# Patient Record
Sex: FEMALE | Race: WHITE | NOT HISPANIC OR LATINO | Employment: FULL TIME | URBAN - METROPOLITAN AREA
[De-identification: names, ages, dates, MRNs, and addresses within clinical notes are randomized per-mention and may not be internally consistent; named-entity substitution may affect disease eponyms.]

---

## 2023-01-30 PROBLEM — J45.909 ASTHMATIC BRONCHITIS (HHS-HCC): Status: ACTIVE | Noted: 2023-01-30

## 2023-01-30 PROBLEM — E55.9 VITAMIN D DEFICIENCY: Status: ACTIVE | Noted: 2023-01-30

## 2023-01-30 PROBLEM — R73.09 ABNORMAL GLUCOSE: Status: ACTIVE | Noted: 2023-01-30

## 2023-01-30 PROBLEM — E03.9 HYPOTHYROIDISM: Status: ACTIVE | Noted: 2023-01-30

## 2023-01-30 PROBLEM — F41.9 ANXIETY: Status: ACTIVE | Noted: 2023-01-30

## 2023-01-30 RX ORDER — DROSPIRENONE AND ETHINYL ESTRADIOL 0.02-3(28)
1 KIT ORAL DAILY
COMMUNITY
Start: 2022-06-01

## 2023-01-30 RX ORDER — AZELASTINE 1 MG/ML
SPRAY, METERED NASAL
COMMUNITY
Start: 2022-05-24 | End: 2023-04-18 | Stop reason: ALTCHOICE

## 2023-01-30 RX ORDER — CHOLECALCIFEROL (VITAMIN D3) 125 MCG
1 CAPSULE ORAL DAILY
COMMUNITY
Start: 2022-01-21

## 2023-01-30 RX ORDER — FLUTICASONE PROPIONATE AND SALMETEROL 100; 50 UG/1; UG/1
1 POWDER RESPIRATORY (INHALATION) EVERY 12 HOURS
COMMUNITY
Start: 2022-06-01

## 2023-01-30 RX ORDER — ALBUTEROL SULFATE 90 UG/1
2 AEROSOL, METERED RESPIRATORY (INHALATION) EVERY 4 HOURS PRN
COMMUNITY
Start: 2022-01-21

## 2023-01-30 RX ORDER — BUSPIRONE HYDROCHLORIDE 15 MG/1
TABLET ORAL EVERY 12 HOURS
COMMUNITY
Start: 2022-06-01 | End: 2023-10-06 | Stop reason: ALTCHOICE

## 2023-01-30 RX ORDER — LEVOTHYROXINE SODIUM 25 UG/1
TABLET ORAL
COMMUNITY
Start: 2022-01-15 | End: 2023-05-30 | Stop reason: SDUPTHER

## 2023-04-05 ENCOUNTER — PATIENT MESSAGE (OUTPATIENT)
Dept: PRIMARY CARE | Facility: CLINIC | Age: 33
End: 2023-04-05
Payer: COMMERCIAL

## 2023-04-05 DIAGNOSIS — G93.32 POST-COVID CHRONIC FATIGUE: ICD-10-CM

## 2023-04-05 DIAGNOSIS — U09.9 POST-COVID CHRONIC FATIGUE: ICD-10-CM

## 2023-04-05 NOTE — TELEPHONE ENCOUNTER
From: Mara Herbert  To: Lyn Morrissey, DO  Sent: 4/5/2023 4:21 PM EDT  Subject: Fatigue    Hi Dr Morrissey,   I hope you are doing well. I know a few months ago I requested my labs to be done early due to extreme fatigue lately. Looks like all my labs were WNL. I’m still experiencing terrible midday fatigue where I have to nap (kind of reminds me of pregnancy). I was trying to ride it out until my annual appointment, but I had to move that back for work travel. I have had Covid 3x - fina if it is lingering effects. Any thoughts in the mean time? Should I make an appointment in the interim?    Mona

## 2023-04-18 ENCOUNTER — OFFICE VISIT (OUTPATIENT)
Dept: PRIMARY CARE | Facility: CLINIC | Age: 33
End: 2023-04-18
Payer: COMMERCIAL

## 2023-04-18 VITALS
BODY MASS INDEX: 28.34 KG/M2 | WEIGHT: 150 LBS | OXYGEN SATURATION: 98 % | HEART RATE: 82 BPM | TEMPERATURE: 97.1 F | DIASTOLIC BLOOD PRESSURE: 81 MMHG | SYSTOLIC BLOOD PRESSURE: 133 MMHG

## 2023-04-18 DIAGNOSIS — G47.19 EXCESSIVE DAYTIME SLEEPINESS: Primary | ICD-10-CM

## 2023-04-18 PROCEDURE — 1036F TOBACCO NON-USER: CPT | Performed by: NURSE PRACTITIONER

## 2023-04-18 PROCEDURE — 99213 OFFICE O/P EST LOW 20 MIN: CPT | Performed by: NURSE PRACTITIONER

## 2023-04-18 ASSESSMENT — ENCOUNTER SYMPTOMS
ABDOMINAL DISTENTION: 0
PALPITATIONS: 0
SPEECH DIFFICULTY: 0
FACIAL SWELLING: 0
CHILLS: 0
FREQUENCY: 0
SHORTNESS OF BREATH: 0
SINUS PAIN: 0
NUMBNESS: 0
BLOOD IN STOOL: 0
ACTIVITY CHANGE: 1
CONSTIPATION: 0
VOMITING: 0
FLANK PAIN: 0
RHINORRHEA: 0
WHEEZING: 0
HEADACHES: 0
NAUSEA: 0
UNEXPECTED WEIGHT CHANGE: 0
WEAKNESS: 0
FATIGUE: 1
FEVER: 0
DIARRHEA: 0
APPETITE CHANGE: 0

## 2023-04-18 NOTE — PATIENT INSTRUCTIONS
Your sleep study will be mailed to you within the next 1-2 weeks   Please let us know if you do not hear anything from them   Please follow-up with COVID long-haul clinic as planned for August 2023  Please follow-up with your PCP as needed

## 2023-04-18 NOTE — ASSESSMENT & PLAN NOTE
Refer to COVID long-parag [facility]--plan for August 2023  Sleep study ordered  Blood work reviewed with patient

## 2023-04-18 NOTE — PROGRESS NOTES
Subjective   Chief Complaint: Fatigue (Ongoing since December. ).    DONTRELL Herbert is a 33 y.o. female who presents for Fatigue (Ongoing since December. ).    Patient reports that she has been feeling fatigued since December 2022.  She reports having had COVID 3 times prior to the start of her fatigue.  Patient reports that she reached out to the [facility] and was told that her earliest appointment will be August 2023.    Recent blood work was reviewed with patient.  Blood work does not show any indications of anemia, thyroid abnormalities.  She continues to use Synthroid daily 25 mcg as prescribed.    Patient reports that her fatigue is severe.  She reports that every day between 1:30 PM to 2 PM she has to take a nap.    She reports that she has 2 young children age of 1-1/2 and 4.  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  Patient reports that she goes to bed at 9:30 PM every night and is awake and at least 1-2 times a night by her child (tween 11 PM and 1 AM) and wakes up at 5 AM every morning.  Patient reports that she was exercising up until 1 month ago when she injured her foot.  She reports a healthy diet.  Patient denies increased stressors in her life.  She reports that her job is not stressful.  Patient denies anxiety and reports that this is well controlled on the BuSpar.  She reports taking the BuSpar every morning and every evening.    Patient reports normal bowel movements.  She denies snoring at night.  Patient reports that she wakes up every morning not feeling well rested.  She reports being sexually active.  Her last menstrual period was 3 weeks ago.  She has taken multiple pregnancy tests over the course of the last few months which have all been negative.    Patient reports 1 caffeinated beverage daily.  She reports social alcohol use.  Denies recreational drug use      Review of Systems   Constitutional:  Positive for activity change and fatigue. Negative  for appetite change, chills, fever and unexpected weight change.   HENT:  Negative for congestion, ear pain, facial swelling, postnasal drip, rhinorrhea and sinus pain.    Respiratory:  Negative for shortness of breath and wheezing.    Cardiovascular:  Negative for chest pain, palpitations and leg swelling.   Gastrointestinal:  Negative for abdominal distention, blood in stool, constipation, diarrhea, nausea and vomiting.   Genitourinary:  Negative for flank pain, frequency and urgency.   Neurological:  Negative for speech difficulty, weakness, numbness and headaches.       Objective   /81   Pulse 82   Temp 36.2 °C (97.1 °F) (Temporal)   Wt 68 kg (150 lb)   SpO2 98%   BMI 28.34 kg/m²   BSA Body surface area is 1.71 meters squared.      Physical Exam  Constitutional:       Appearance: Normal appearance.   HENT:      Right Ear: Tympanic membrane normal.      Left Ear: Tympanic membrane normal.      Nose: Nose normal.   Eyes:      Extraocular Movements: Extraocular movements intact.      Pupils: Pupils are equal, round, and reactive to light.   Cardiovascular:      Rate and Rhythm: Normal rate and regular rhythm.      Pulses: Normal pulses.      Heart sounds: Normal heart sounds.   Pulmonary:      Effort: Pulmonary effort is normal.      Breath sounds: Normal breath sounds.   Abdominal:      General: Abdomen is flat.      Palpations: Abdomen is soft.   Musculoskeletal:         General: Normal range of motion.   Skin:     General: Skin is warm.   Neurological:      General: No focal deficit present.      Mental Status: She is alert.       Legacy Encounter on 01/17/2023   Component Date Value Ref Range Status    Glucose 01/17/2023 87  74 - 99 mg/dL Final    Sodium 01/17/2023 140  136 - 145 mmol/L Final    Potassium 01/17/2023 4.3  3.5 - 5.3 mmol/L Final    Chloride 01/17/2023 107  98 - 107 mmol/L Final    Bicarbonate 01/17/2023 27  21 - 32 mmol/L Final    Anion Gap 01/17/2023 10  10 - 20 mmol/L Final    Urea  Nitrogen 01/17/2023 13  6 - 23 mg/dL Final    Creatinine 01/17/2023 0.85  0.50 - 1.05 mg/dL Final    GFR Female 01/17/2023 >90  >90 mL/min/1.73m2 Final    Comment:  CALCULATIONS OF ESTIMATED GFR ARE PERFORMED   USING THE 2021 CKD-EPI STUDY REFIT EQUATION   WITHOUT THE RACE VARIABLE FOR THE IDMS-TRACEABLE   CREATININE METHODS.    https://jasn.asnjournals.org/content/early/2021/09/22/ASN.8920178123      Calcium 01/17/2023 9.1  8.6 - 10.6 mg/dL Final    Albumin 01/17/2023 3.7  3.4 - 5.0 g/dL Final    Alkaline Phosphatase 01/17/2023 50  33 - 110 U/L Final    Total Protein 01/17/2023 6.4  6.4 - 8.2 g/dL Final    AST 01/17/2023 17  9 - 39 U/L Final    Total Bilirubin 01/17/2023 0.8  0.0 - 1.2 mg/dL Final    ALT (SGPT) 01/17/2023 16  7 - 45 U/L Final    Comment:  Patients treated with Sulfasalazine may generate    falsely decreased results for ALT.      WBC 01/17/2023 6.4  4.4 - 11.3 x10E9/L Final    nRBC 01/17/2023 0.0  0.0 - 0.0 /100 WBC Final    RBC 01/17/2023 4.74  4.00 - 5.20 x10E12/L Final    Hemoglobin 01/17/2023 13.6  12.0 - 16.0 g/dL Final    Hematocrit 01/17/2023 40.9  36.0 - 46.0 % Final    MCV 01/17/2023 86  80 - 100 fL Final    MCHC 01/17/2023 33.3  32.0 - 36.0 g/dL Final    Platelets 01/17/2023 258  150 - 450 x10E9/L Final    RDW 01/17/2023 12.5  11.5 - 14.5 % Final    Neutrophils % 01/17/2023 56.7  40.0 - 80.0 % Final    Immature Granulocytes %, Automated 01/17/2023 0.3  0.0 - 0.9 % Final    Comment:  Immature Granulocyte Count (IG) includes promyelocytes,    myelocytes and metamyelocytes but does not include bands.   Percent differential counts (%) should be interpreted in the   context of the absolute cell counts (cells/L).      Lymphocytes % 01/17/2023 31.3  13.0 - 44.0 % Final    Monocytes % 01/17/2023 7.0  2.0 - 10.0 % Final    Eosinophils % 01/17/2023 4.2  0.0 - 6.0 % Final    Basophils % 01/17/2023 0.5  0.0 - 2.0 % Final    Neutrophils Absolute 01/17/2023 3.63  1.20 - 7.70 x10E9/L Final    Lymphocytes  Absolute 01/17/2023 2.00  1.20 - 4.80 x10E9/L Final    Monocytes Absolute 01/17/2023 0.45  0.10 - 1.00 x10E9/L Final    Eosinophils Absolute 01/17/2023 0.27  0.00 - 0.70 x10E9/L Final    Basophils Absolute 01/17/2023 0.03  0.00 - 0.10 x10E9/L Final    TSH 01/17/2023 1.37  0.44 - 3.98 mIU/L Final    Comment:  TSH testing is performed using different testing    methodology at [facility] than at other    [facility]. Direct result comparisons should    only be made within the same method.      Cholesterol 01/17/2023 166  0 - 199 mg/dL Final    Comment: .      AGE      DESIRABLE   BORDERLINE HIGH   HIGH     0-19 Y     0 - 169       170 - 199     >/= 200    20-24 Y     0 - 189       190 - 224     >/= 225         >24 Y     0 - 199       200 - 239     >/= 240   **All ranges are based on fasting samples. Specific   therapeutic targets will vary based on patient-specific   cardiac risk.  .   Pediatric guidelines reference:Pediatrics 2011, 128(S5).   Adult guidelines reference: NCEP ATPIII Guidelines,     ZENAIDA 2001, 258:2486-97  .   Venipuncture immediately after or during the    administration of Metamizole may lead to falsely   low results. Testing should be performed immediately   prior to Metamizole dosing.      HDL 01/17/2023 68.0  mg/dL Final    Comment: .      AGE      VERY LOW   LOW     NORMAL    HIGH       0-19 Y       < 35   < 40     40-45     ----    20-24 Y       ----   < 40       >45     ----      >24 Y       ----   < 40     40-60      >60  .      Cholesterol/HDL Ratio 01/17/2023 2.4   Final    Comment: REF VALUES  DESIRABLE  < 3.4  HIGH RISK  > 5.0      LDL 01/17/2023 81  0 - 99 mg/dL Final    Comment: .                           NEAR      BORD      AGE      DESIRABLE  OPTIMAL    HIGH     HIGH     VERY HIGH     0-19 Y     0 - 109     ---    110-129   >/= 130     ----    20-24 Y     0 - 119     ---    120-159   >/= 160     ----      >24 Y     0 -  99   100-129  130-159   160-189     >/=190  .       VLDL 01/17/2023 17  0 - 40 mg/dL Final    Triglycerides 01/17/2023 83  0 - 149 mg/dL Final    Comment: .      AGE      DESIRABLE   BORDERLINE HIGH   HIGH     VERY HIGH   0 D-90 D    19 - 174         ----         ----        ----  91 D- 9 Y     0 -  74        75 -  99     >/= 100      ----    10-19 Y     0 -  89        90 - 129     >/= 130      ----    20-24 Y     0 - 114       115 - 149     >/= 150      ----         >24 Y     0 - 149       150 - 199    200- 499    >/= 500  .   Venipuncture immediately after or during the    administration of Metamizole may lead to falsely   low results. Testing should be performed immediately   prior to Metamizole dosing.      Vitamin D, 25-Hydroxy 01/17/2023 31  ng/mL Final    Comment: .  DEFICIENCY:         < 20   NG/ML  INSUFFICIENCY:      20-29  NG/ML  SUFFICIENCY:         NG/ML    THIS ASSAY ACCURATELY QUANTIFIES THE SUM OF  VITAMIN D3, 25-HYDROXY AND VIT D2,25-HYDROXY.     Legacy Encounter on 06/01/2022   Component Date Value Ref Range Status    TSH 06/01/2022 1.69  0.44 - 3.98 mIU/L Final    Comment:  TSH testing is performed using different testing    methodology at [facility] than at other    [facility]. Direct result comparisons should    only be made within the same method.     Legacy Encounter on 06/01/2022   Component Date Value Ref Range Status    SARS-COV-2 RESULT 06/01/2022 NOT DETECTED  Not Detected Final    Comment: .  This assay is designed to detect the N, ORF1ab and/or S genes of SARS-CoV-2   via nucleic acid amplification.  A Negative (NOT DETECTED) result does not   preclude 2019-nCoV infection since the adequacy of sample collection and/or   low viral burden may result in presence of viral nucleic acids below the   clinical sensitivity of this test method.  Negative (NOT DETECTED) result   should not be used as the sole basis for treatment or other patient   management decisions. Rather negative results should be combined with    clinical observations, patient history, and epidemiological information to   make patient management decisions.    Fact sheet for providers: https://www.fda.gov/media/310527/download  Fact sheet for patients: https://www.fda.gov/media/297671/download    This test has received FDA Emergency Use Authorization (EUA) and has been   verified by [facility] ([facility]). This test   is only authorized for the duration of time that circumstances                            exist to   justify the authorization of the emergency use of in vitro diagnostic tests   for the detection of SARS-CoV-2 virus and/or diagnosis of COVID-19 infection   under section 564(b)(1) of the Act, 21 U.S.C. 360bbb-3(b)(1), unless the   authorization is terminated or revoked sooner.      [facility] is certified under CLIA-88 as   qualified to perform high complexity testing. Testing is performed in the   [facility] laboratories located at [address] [place] [address].       Current Outpatient Medications on File Prior to Visit   Medication Sig Dispense Refill    albuterol 90 mcg/actuation inhaler Inhale 2 puffs every 4 hours if needed for shortness of breath.      busPIRone (Buspar) 15 mg tablet Take by mouth every 12 (twelve) hours.      cholecalciferol (Vitamin D-3) 125 MCG (5000 UT) capsule Take 1 capsule (125 mcg) by mouth once daily.      drospirenone-ethinyl estradioL (Edilia, Gianvi) 3-0.02 mg tablet Take 1 tablet by mouth once daily.      fluticasone propion-salmeteroL (Advair Diskus) 100-50 mcg/dose diskus inhaler Inhale 1 puff  in the morning and 1 puff in the evening.      levothyroxine (Synthroid, Levoxyl) 25 mcg tablet Take by mouth.      [DISCONTINUED] azelastine (Astelin) 137 mcg (0.1 %) nasal spray Administer into affected nostril(s).       No current facility-administered medications on file prior to visit.     No images are attached to the encounter.             Assessment/Plan   Problem List Items Addressed This Visit          Nervous    Excessive daytime sleepiness - Primary     Refer to COVID long-parag [facility]--plan for August 2023  Sleep study ordered  Blood work reviewed with patient           Relevant Orders    Home sleep apnea test (HSAT)

## 2023-04-28 ENCOUNTER — OFFICE VISIT (OUTPATIENT)
Dept: PRIMARY CARE | Facility: CLINIC | Age: 33
End: 2023-04-28
Payer: COMMERCIAL

## 2023-04-28 VITALS
DIASTOLIC BLOOD PRESSURE: 70 MMHG | SYSTOLIC BLOOD PRESSURE: 118 MMHG | WEIGHT: 149 LBS | BODY MASS INDEX: 28.13 KG/M2 | HEART RATE: 80 BPM | HEIGHT: 61 IN

## 2023-04-28 DIAGNOSIS — E55.9 VITAMIN D DEFICIENCY: ICD-10-CM

## 2023-04-28 DIAGNOSIS — Z00.00 HEALTHCARE MAINTENANCE: Primary | ICD-10-CM

## 2023-04-28 DIAGNOSIS — R73.09 ABNORMAL GLUCOSE: ICD-10-CM

## 2023-04-28 DIAGNOSIS — E03.9 HYPOTHYROIDISM, UNSPECIFIED TYPE: ICD-10-CM

## 2023-04-28 PROBLEM — J45.909 ASTHMATIC BRONCHITIS (HHS-HCC): Status: RESOLVED | Noted: 2023-01-30 | Resolved: 2023-04-28

## 2023-04-28 PROCEDURE — 1036F TOBACCO NON-USER: CPT | Performed by: FAMILY MEDICINE

## 2023-04-28 PROCEDURE — 99395 PREV VISIT EST AGE 18-39: CPT | Performed by: FAMILY MEDICINE

## 2023-04-28 ASSESSMENT — PATIENT HEALTH QUESTIONNAIRE - PHQ9
SUM OF ALL RESPONSES TO PHQ9 QUESTIONS 1 AND 2: 0
1. LITTLE INTEREST OR PLEASURE IN DOING THINGS: NOT AT ALL
2. FEELING DOWN, DEPRESSED OR HOPELESS: NOT AT ALL

## 2023-04-28 NOTE — PROGRESS NOTES
"Subjective   Patient ID: Mara Herbert is a 33 y.o. female who presents for Annual Exam.    HPI   Patient presents for physical exam. Patient has been diagnosed with hypothyroidism and vitamin d deficiency. She had previously had elevation in one hour glucose tolerance test but 3 hour normal.     Fam Hx  Mom () living,  Dad () living,  PGM colon polyps not cancer     Exercise walks, yoga, runs  ETOH denies, once a month  Caffeine 16 oz coffee/day  Tobacco denies     OB/GYN Dr. Talbert     Mammogram @ 41  DEXA @ 66  EGD, Colonoscopy  Dr. Sewell not due until 45-50      Past Med Hx  Hypothyroidism  Vitamin D deficiency  Eosinophilic esophagitis (resolved)  severe asthma (resolved)     Past Surg Hx  T & A  TMJ surgery  2      Patient denies other complaints.   Review of Systems    Objective   /70 (BP Location: Right arm)   Pulse 80   Ht 1.549 m (5' 1\")   Wt 67.6 kg (149 lb)   BMI 28.15 kg/m²   BSA Body surface area is 1.71 meters squared.      Physical Exam  Legacy Encounter on 2023   Component Date Value Ref Range Status   • Glucose 2023 87  74 - 99 mg/dL Final   • Sodium 2023 140  136 - 145 mmol/L Final   • Potassium 2023 4.3  3.5 - 5.3 mmol/L Final   • Chloride 2023 107  98 - 107 mmol/L Final   • Bicarbonate 2023 27  21 - 32 mmol/L Final   • Anion Gap 2023 10  10 - 20 mmol/L Final   • Urea Nitrogen 2023 13  6 - 23 mg/dL Final   • Creatinine 2023 0.85  0.50 - 1.05 mg/dL Final   • GFR Female 2023 >90  >90 mL/min/1.73m2 Final    Comment:  CALCULATIONS OF ESTIMATED GFR ARE PERFORMED   USING THE  CKD-EPI STUDY REFIT EQUATION   WITHOUT THE RACE VARIABLE FOR THE IDMS-TRACEABLE   CREATININE METHODS.    https://jasn.asnjournals.org/content/early//ASN.5761151315     • Calcium 2023 9.1  8.6 - 10.6 mg/dL Final   • Albumin 2023 3.7  3.4 - 5.0 g/dL Final   • Alkaline Phosphatase 2023 50  33 - 110 U/L Final   • Total " Protein 01/17/2023 6.4  6.4 - 8.2 g/dL Final   • AST 01/17/2023 17  9 - 39 U/L Final   • Total Bilirubin 01/17/2023 0.8  0.0 - 1.2 mg/dL Final   • ALT (SGPT) 01/17/2023 16  7 - 45 U/L Final    Comment:  Patients treated with Sulfasalazine may generate    falsely decreased results for ALT.     • WBC 01/17/2023 6.4  4.4 - 11.3 x10E9/L Final   • nRBC 01/17/2023 0.0  0.0 - 0.0 /100 WBC Final   • RBC 01/17/2023 4.74  4.00 - 5.20 x10E12/L Final   • Hemoglobin 01/17/2023 13.6  12.0 - 16.0 g/dL Final   • Hematocrit 01/17/2023 40.9  36.0 - 46.0 % Final   • MCV 01/17/2023 86  80 - 100 fL Final   • MCHC 01/17/2023 33.3  32.0 - 36.0 g/dL Final   • Platelets 01/17/2023 258  150 - 450 x10E9/L Final   • RDW 01/17/2023 12.5  11.5 - 14.5 % Final   • Neutrophils % 01/17/2023 56.7  40.0 - 80.0 % Final   • Immature Granulocytes %, Automated 01/17/2023 0.3  0.0 - 0.9 % Final    Comment:  Immature Granulocyte Count (IG) includes promyelocytes,    myelocytes and metamyelocytes but does not include bands.   Percent differential counts (%) should be interpreted in the   context of the absolute cell counts (cells/L).     • Lymphocytes % 01/17/2023 31.3  13.0 - 44.0 % Final   • Monocytes % 01/17/2023 7.0  2.0 - 10.0 % Final   • Eosinophils % 01/17/2023 4.2  0.0 - 6.0 % Final   • Basophils % 01/17/2023 0.5  0.0 - 2.0 % Final   • Neutrophils Absolute 01/17/2023 3.63  1.20 - 7.70 x10E9/L Final   • Lymphocytes Absolute 01/17/2023 2.00  1.20 - 4.80 x10E9/L Final   • Monocytes Absolute 01/17/2023 0.45  0.10 - 1.00 x10E9/L Final   • Eosinophils Absolute 01/17/2023 0.27  0.00 - 0.70 x10E9/L Final   • Basophils Absolute 01/17/2023 0.03  0.00 - 0.10 x10E9/L Final   • TSH 01/17/2023 1.37  0.44 - 3.98 mIU/L Final    Comment:  TSH testing is performed using different testing    methodology at [facility] than at other    [facility] hospitals. Direct result comparisons should    only be made within the same method.     • Cholesterol 01/17/2023 166   0 - 199 mg/dL Final    Comment: .      AGE      DESIRABLE   BORDERLINE HIGH   HIGH     0-19 Y     0 - 169       170 - 199     >/= 200    20-24 Y     0 - 189       190 - 224     >/= 225         >24 Y     0 - 199       200 - 239     >/= 240   **All ranges are based on fasting samples. Specific   therapeutic targets will vary based on patient-specific   cardiac risk.  .   Pediatric guidelines reference:Pediatrics 2011, 128(S5).   Adult guidelines reference: NCEP ATPIII Guidelines,     ZENAIDA 2001, 258:2486-97  .   Venipuncture immediately after or during the    administration of Metamizole may lead to falsely   low results. Testing should be performed immediately   prior to Metamizole dosing.     • HDL 01/17/2023 68.0  mg/dL Final    Comment: .      AGE      VERY LOW   LOW     NORMAL    HIGH       0-19 Y       < 35   < 40     40-45     ----    20-24 Y       ----   < 40       >45     ----      >24 Y       ----   < 40     40-60      >60  .     • Cholesterol/HDL Ratio 01/17/2023 2.4   Final    Comment: REF VALUES  DESIRABLE  < 3.4  HIGH RISK  > 5.0     • LDL 01/17/2023 81  0 - 99 mg/dL Final    Comment: .                           NEAR      BORD      AGE      DESIRABLE  OPTIMAL    HIGH     HIGH     VERY HIGH     0-19 Y     0 - 109     ---    110-129   >/= 130     ----    20-24 Y     0 - 119     ---    120-159   >/= 160     ----      >24 Y     0 -  99   100-129  130-159   160-189     >/=190  .     • VLDL 01/17/2023 17  0 - 40 mg/dL Final   • Triglycerides 01/17/2023 83  0 - 149 mg/dL Final    Comment: .      AGE      DESIRABLE   BORDERLINE HIGH   HIGH     VERY HIGH   0 D-90 D    19 - 174         ----         ----        ----  91 D- 9 Y     0 -  74        75 -  99     >/= 100      ----    10-19 Y     0 -  89        90 - 129     >/= 130      ----    20-24 Y     0 - 114       115 - 149     >/= 150      ----         >24 Y     0 - 149       150 - 199    200- 499    >/= 500  .   Venipuncture immediately after or during the     administration of Metamizole may lead to falsely   low results. Testing should be performed immediately   prior to Metamizole dosing.     • Vitamin D, 25-Hydroxy 01/17/2023 31  ng/mL Final    Comment: .  DEFICIENCY:         < 20   NG/ML  INSUFFICIENCY:      20-29  NG/ML  SUFFICIENCY:         NG/ML    THIS ASSAY ACCURATELY QUANTIFIES THE SUM OF  VITAMIN D3, 25-HYDROXY AND VIT D2,25-HYDROXY.     Legacy Encounter on 06/01/2022   Component Date Value Ref Range Status   • TSH 06/01/2022 1.69  0.44 - 3.98 mIU/L Final    Comment:  TSH testing is performed using different testing    methodology at [facility] than at other    [facility]. Direct result comparisons should    only be made within the same method.     Legacy Encounter on 06/01/2022   Component Date Value Ref Range Status   • SARS-COV-2 RESULT 06/01/2022 NOT DETECTED  Not Detected Final    Comment: .  This assay is designed to detect the N, ORF1ab and/or S genes of SARS-CoV-2   via nucleic acid amplification.  A Negative (NOT DETECTED) result does not   preclude 2019-nCoV infection since the adequacy of sample collection and/or   low viral burden may result in presence of viral nucleic acids below the   clinical sensitivity of this test method.  Negative (NOT DETECTED) result   should not be used as the sole basis for treatment or other patient   management decisions. Rather negative results should be combined with   clinical observations, patient history, and epidemiological information to   make patient management decisions.    Fact sheet for providers: https://www.fda.gov/media/685955/download  Fact sheet for patients: https://www.fda.gov/media/646235/download    This test has received FDA Emergency Use Authorization (EUA) and has been   verified by [facility] ([facility]). This test   is only authorized for the duration of time that circumstances                            exist to   justify the  authorization of the emergency use of in vitro diagnostic tests   for the detection of SARS-CoV-2 virus and/or diagnosis of COVID-19 infection   under section 564(b)(1) of the Act, 21 U.S.C. 360bbb-3(b)(1), unless the   authorization is terminated or revoked sooner.      [facility] is certified under CLIA-88 as   qualified to perform high complexity testing. Testing is performed in the   [facility] laboratories located at [address] [place] [address].       Current Outpatient Medications on File Prior to Visit   Medication Sig Dispense Refill   • albuterol 90 mcg/actuation inhaler Inhale 2 puffs every 4 hours if needed for shortness of breath.     • busPIRone (Buspar) 15 mg tablet Take by mouth every 12 (twelve) hours.     • cholecalciferol (Vitamin D-3) 125 MCG (5000 UT) capsule Take 1 capsule (125 mcg) by mouth once daily.     • drospirenone-ethinyl estradioL (Edilia, Gianvi) 3-0.02 mg tablet Take 1 tablet by mouth once daily.     • fluticasone propion-salmeteroL (Advair Diskus) 100-50 mcg/dose diskus inhaler Inhale 1 puff every 12 hours.     • levothyroxine (Synthroid, Levoxyl) 25 mcg tablet Take by mouth.       No current facility-administered medications on file prior to visit.     No images are attached to the encounter.            Assessment/Plan   Diagnoses and all orders for this visit:  Healthcare maintenance  Hypothyroidism, unspecified type  Vitamin D deficiency  Abnormal glucose    1. Patient's blood work discussed at this office visit  2. Patient's LDL goal less than 130 current LDL 81  3. Patient's glucose is back to normal  4. Mammogram @ 41  5. DEXA @ 66  6. EGD, Colonoscopy 2017 Dr. Sewell not due until 45-50   7. Patient to call if questions or concerns

## 2023-05-04 DIAGNOSIS — R10.11 RUQ PAIN: ICD-10-CM

## 2023-05-26 DIAGNOSIS — E03.9 ACQUIRED HYPOTHYROIDISM: ICD-10-CM

## 2023-05-26 NOTE — TELEPHONE ENCOUNTER
[facility] [phone]    Levothyroxine 25mcg  1per day except Saturday and Sunday then pt takes 2 tablets - 90 day supply

## 2023-05-30 ENCOUNTER — APPOINTMENT (OUTPATIENT)
Dept: PRIMARY CARE | Facility: CLINIC | Age: 33
End: 2023-05-30

## 2023-05-30 RX ORDER — LEVOTHYROXINE SODIUM 25 UG/1
TABLET ORAL
Qty: 90 TABLET | Refills: 1 | Status: SHIPPED | OUTPATIENT
Start: 2023-05-30 | End: 2024-01-29 | Stop reason: SDUPTHER

## 2023-05-30 NOTE — ADDENDUM NOTE
Addended by: JOSE BAY on: 5/30/2023 04:15 PM     Modules accepted: Orders     Detail Level: Detailed Detail Level: Generalized Detail Level: Zone Detail Level: Simple

## 2023-05-31 DIAGNOSIS — G47.19 EXCESSIVE DAYTIME SLEEPINESS: ICD-10-CM

## 2023-06-13 ENCOUNTER — APPOINTMENT (OUTPATIENT)
Dept: PRIMARY CARE | Facility: CLINIC | Age: 33
End: 2023-06-13

## 2023-09-01 ENCOUNTER — OFFICE VISIT (OUTPATIENT)
Dept: PRIMARY CARE | Facility: CLINIC | Age: 33
End: 2023-09-01
Payer: COMMERCIAL

## 2023-09-01 VITALS
HEART RATE: 69 BPM | TEMPERATURE: 97.7 F | WEIGHT: 153 LBS | SYSTOLIC BLOOD PRESSURE: 120 MMHG | OXYGEN SATURATION: 97 % | BODY MASS INDEX: 28.89 KG/M2 | HEIGHT: 61 IN | DIASTOLIC BLOOD PRESSURE: 79 MMHG

## 2023-09-01 DIAGNOSIS — M25.551 RIGHT HIP PAIN: Primary | ICD-10-CM

## 2023-09-01 PROCEDURE — 99213 OFFICE O/P EST LOW 20 MIN: CPT | Performed by: STUDENT IN AN ORGANIZED HEALTH CARE EDUCATION/TRAINING PROGRAM

## 2023-09-01 PROCEDURE — 1036F TOBACCO NON-USER: CPT | Performed by: STUDENT IN AN ORGANIZED HEALTH CARE EDUCATION/TRAINING PROGRAM

## 2023-09-01 RX ORDER — METHYLPREDNISOLONE 4 MG/1
TABLET ORAL
Qty: 21 TABLET | Refills: 0 | Status: SHIPPED | OUTPATIENT
Start: 2023-09-01 | End: 2023-09-08

## 2023-09-01 ASSESSMENT — ENCOUNTER SYMPTOMS
HEADACHES: 0
CHILLS: 0
FEVER: 0
NUMBNESS: 0
RHINORRHEA: 0
OCCASIONAL FEELINGS OF UNSTEADINESS: 0
LOSS OF SENSATION IN FEET: 0
WEAKNESS: 0
BACK PAIN: 1
ABDOMINAL PAIN: 0
SHORTNESS OF BREATH: 0
FATIGUE: 0
DIZZINESS: 0
ARTHRALGIAS: 1
LIGHT-HEADEDNESS: 0
DEPRESSION: 0
MYALGIAS: 0
COUGH: 0

## 2023-09-01 NOTE — PROGRESS NOTES
"Subjective   Patient ID: Mara Herbert is a 33 y.o. female who presents for Back Pain (Into right hip X couple weeks ).    Back Pain  Pertinent negatives include no abdominal pain, chest pain, fever, headaches, numbness or weakness.       Right sided hip pain in the back, side and front of the right hip for the past few weeks.  The pain will wax and wane. Most of the time the pain is dull and then will flare up into a burning pain.  She has not noticed any specific activities that seem to set it off.  Sitting seems to be worse than standing.  No inciting event. She was at a long conference where they were sitting most of the time and she noticed it after getting back.  She did gymnastics when she was younger, but no previous specific injuries.  No recent imaging.  One time she had pain shoot down into her knee but nothing into her foot.    Review of Systems   Constitutional:  Negative for chills, fatigue and fever.   HENT:  Negative for congestion and rhinorrhea.    Respiratory:  Negative for cough and shortness of breath.    Cardiovascular:  Negative for chest pain.   Gastrointestinal:  Negative for abdominal pain.   Musculoskeletal:  Positive for arthralgias (right hip) and back pain (right sided lower back). Negative for myalgias.   Neurological:  Negative for dizziness, weakness, light-headedness, numbness and headaches.       Objective   /79   Pulse 69   Temp 36.5 °C (97.7 °F)   Ht 1.549 m (5' 1\")   Wt 69.4 kg (153 lb)   SpO2 97%   BMI 28.91 kg/m²     Physical Exam  Vitals and nursing note reviewed.   Constitutional:       General: She is not in acute distress.     Appearance: Normal appearance. She is normal weight. She is not ill-appearing or toxic-appearing.   HENT:      Head: Normocephalic and atraumatic.   Cardiovascular:      Rate and Rhythm: Normal rate and regular rhythm.      Heart sounds: Normal heart sounds.   Pulmonary:      Effort: Pulmonary effort is normal.      Breath sounds: " Normal breath sounds.   Musculoskeletal:         General: Tenderness present. No swelling, deformity or signs of injury. Normal range of motion.      Comments: Full range of motion of the right hip.  No significant pain with resisted flexion or extension of the right hip.  No significant pain with internal rotation.  Minor pain with external rotation.   Skin:     General: Skin is warm and dry.   Neurological:      Mental Status: She is alert.         Assessment/Plan   Problem List Items Addressed This Visit    None  Visit Diagnoses       Right hip pain    -  Primary    Relevant Medications    methylPREDNISolone (Medrol Dospak) 4 mg tablets    Other Relevant Orders    XR hip right 2 or 3 views          History and physical examination as above.  Uncertain etiology of the right hip especially with no particular trauma or injury.  We will order x-rays of the right hip.  Started on a Medrol Dosepak to help calm down symptoms.  Given conservative treatment.  Patient to call back if symptoms change or worsen.

## 2023-09-12 ENCOUNTER — APPOINTMENT (OUTPATIENT)
Dept: PRIMARY CARE | Facility: CLINIC | Age: 33
End: 2023-09-12

## 2023-10-06 ENCOUNTER — OFFICE VISIT (OUTPATIENT)
Dept: ALLERGY | Facility: CLINIC | Age: 33
End: 2023-10-06
Payer: COMMERCIAL

## 2023-10-06 ENCOUNTER — LAB (OUTPATIENT)
Dept: LAB | Facility: LAB | Age: 33
End: 2023-10-06
Payer: COMMERCIAL

## 2023-10-06 VITALS
RESPIRATION RATE: 18 BRPM | HEART RATE: 85 BPM | DIASTOLIC BLOOD PRESSURE: 88 MMHG | OXYGEN SATURATION: 98 % | TEMPERATURE: 98.2 F | BODY MASS INDEX: 30.19 KG/M2 | WEIGHT: 159.8 LBS | SYSTOLIC BLOOD PRESSURE: 134 MMHG

## 2023-10-06 DIAGNOSIS — E03.8 OTHER SPECIFIED HYPOTHYROIDISM: ICD-10-CM

## 2023-10-06 DIAGNOSIS — J32.9 RECURRENT SINUSITIS: ICD-10-CM

## 2023-10-06 DIAGNOSIS — Z87.2 HISTORY OF ALLERGIC URTICARIA: ICD-10-CM

## 2023-10-06 DIAGNOSIS — J31.0 CHRONIC RHINITIS: Primary | ICD-10-CM

## 2023-10-06 LAB
IGA SERPL-MCNC: 59 MG/DL (ref 70–400)
IGG SERPL-MCNC: 860 MG/DL (ref 700–1600)
IGM SERPL-MCNC: 234 MG/DL (ref 40–230)
THYROPEROXIDASE AB SERPL-ACNC: 29 IU/ML

## 2023-10-06 PROCEDURE — 1036F TOBACCO NON-USER: CPT | Performed by: ALLERGY & IMMUNOLOGY

## 2023-10-06 PROCEDURE — 86376 MICROSOMAL ANTIBODY EACH: CPT

## 2023-10-06 PROCEDURE — 95004 PERQ TESTS W/ALRGNC XTRCS: CPT | Performed by: ALLERGY & IMMUNOLOGY

## 2023-10-06 PROCEDURE — 82784 ASSAY IGA/IGD/IGG/IGM EACH: CPT

## 2023-10-06 PROCEDURE — 86317 IMMUNOASSAY INFECTIOUS AGENT: CPT

## 2023-10-06 PROCEDURE — 99214 OFFICE O/P EST MOD 30 MIN: CPT | Performed by: ALLERGY & IMMUNOLOGY

## 2023-10-06 PROCEDURE — 36415 COLL VENOUS BLD VENIPUNCTURE: CPT

## 2023-10-06 ASSESSMENT — ENCOUNTER SYMPTOMS
HEMATOLOGIC/LYMPHATIC NEGATIVE: 1
EYES NEGATIVE: 1
PSYCHIATRIC NEGATIVE: 1
GASTROINTESTINAL NEGATIVE: 1
NEUROLOGICAL NEGATIVE: 1
CONSTITUTIONAL NEGATIVE: 1
CARDIOVASCULAR NEGATIVE: 1
RESPIRATORY NEGATIVE: 1
MUSCULOSKELETAL NEGATIVE: 1
ENDOCRINE NEGATIVE: 1

## 2023-10-06 NOTE — PROGRESS NOTES
Subjective   Patient ID: [id number]  Mara Herbert is a 33 y.o. female who presents for Allergy Testing.  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  She would like to be tested for environmental and common food allergy.  Risks and benefits of tests reviewed and patient consents to proceed.    Review of Systems   Constitutional: Negative.    HENT: Negative.     Eyes: Negative.    Respiratory: Negative.     Cardiovascular: Negative.    Gastrointestinal: Negative.    Endocrine: Negative.    Genitourinary: Negative.    Musculoskeletal: Negative.    Skin:         Itching without rash   Neurological: Negative.    Hematological: Negative.    Psychiatric/Behavioral: Negative.       Objective   Physical Exam  Vitals and nursing note reviewed.   Constitutional:       Appearance: Normal appearance.   HENT:      Right Ear: Tympanic membrane normal.      Left Ear: Tympanic membrane normal.      Nose: Rhinorrhea present.      Mouth/Throat:      Mouth: Mucous membranes are moist.   Eyes:      Conjunctiva/sclera: Conjunctivae normal.      Pupils: Pupils are equal, round, and reactive to light.   Cardiovascular:      Rate and Rhythm: Normal rate and regular rhythm.      Heart sounds: Normal heart sounds.   Pulmonary:      Effort: Pulmonary effort is normal.      Breath sounds: Normal breath sounds.   Abdominal:      General: Bowel sounds are normal.      Palpations: Abdomen is soft.   Musculoskeletal:         General: Normal range of motion.   Skin:     General: Skin is warm and dry.      Capillary Refill: Capillary refill takes less than 2 seconds.   Neurological:      General: No focal deficit present.      Mental Status: She is alert and oriented to person, place, and time.   Psychiatric:         Mood and Affect: Mood normal.   /88 (BP Location: Left arm)   Pulse 85   Temp 36.8 °C (98.2 °F)   Resp 18   Wt 72.5 kg (159 lb 12.8 oz)   SpO2 98%   BMI 30.19 kg/m²   Lab Results   Component Value Date    WBC 6.4  01/17/2023    HGB 13.6 01/17/2023    HCT 40.9 01/17/2023    MCV 86 01/17/2023     01/17/2023      Lab Results   Component Value Date    GLUCOSE 87 01/17/2023    CALCIUM 9.1 01/17/2023     01/17/2023    K 4.3 01/17/2023    CO2 27 01/17/2023     01/17/2023    BUN 13 01/17/2023    CREATININE 0.85 01/17/2023      Assessment/Plan   Problem List Items Addressed This Visit       Hypothyroidism    Relevant Orders    Thyroid Peroxidase (TPO) Antibody    Chronic rhinitis - Primary    Recurrent sinusitis    Relevant Orders    Immunoglobulins (IgG, IgA, IgM)    Strep Pneumo IgG Ab 23 Serotypes    History of allergic urticaria    Relevant Orders    Thyroid Peroxidase (TPO) Antibody     Allergy test are negative to cat, dog, dust mite, feather, cockroach, molds and pollens.Food tests are also negative to milk, egg, wheat, soy, peanut, tree nut, orange, tomato and strawberry.  Will obtain additional labs due to history of hives and chronic itch as well as recurrent infections. I will call results in order to determine follow up.

## 2023-10-06 NOTE — PATIENT INSTRUCTIONS
Allergy test are negative to cat, dog, dust mite, feather, cockroach, molds and pollens.  Food tests are also negative to milk, egg, wheat, soy, peanut, tree nut, orange, tomato and strawberry.  Let's get labs due to your history of hives and recurrent sinus symptoms. I will call results to you.

## 2023-10-10 LAB
S PN DA SERO 19F IGG SER-MCNC: 11.22 UG/ML
S PNEUM DA 1 IGG SER-MCNC: 5.2 UG/ML
S PNEUM DA 10A IGG SER-MCNC: 1.59 UG/ML
S PNEUM DA 11A IGG SER-MCNC: 1.11 UG/ML
S PNEUM DA 12F IGG SER-MCNC: 1.37 UG/ML
S PNEUM DA 14 IGG SER-MCNC: >35.84 UG/ML
S PNEUM DA 15B IGG SER-MCNC: 0.89 UG/ML
S PNEUM DA 17F IGG SER-MCNC: 0.45 UG/ML
S PNEUM DA 18C IGG SER-MCNC: 0.2 UG/ML
S PNEUM DA 19A IGG SER-MCNC: 1.57 UG/ML
S PNEUM DA 2 IGG SER-MCNC: 11.66 UG/ML
S PNEUM DA 20A IGG SER-MCNC: 0.74 UG/ML
S PNEUM DA 22F IGG SER-MCNC: 0.28 UG/ML
S PNEUM DA 23F IGG SER-MCNC: 0.25 UG/ML
S PNEUM DA 3 IGG SER-MCNC: 2.94 UG/ML
S PNEUM DA 33F IGG SER-MCNC: 0.21 UG/ML
S PNEUM DA 4 IGG SER-MCNC: 1.43 UG/ML
S PNEUM DA 5 IGG SER-MCNC: 8.46 UG/ML
S PNEUM DA 6B IGG SER-MCNC: 0.72 UG/ML
S PNEUM DA 7F IGG SER-MCNC: 8.17 UG/ML
S PNEUM DA 8 IGG SER-MCNC: 7.44 UG/ML
S PNEUM DA 9N IGG SER-MCNC: 0.91 UG/ML
S PNEUM DA 9V IGG SER-MCNC: 10.48 UG/ML
S PNEUM SEROTYPE IGG SER-IMP: NORMAL

## 2023-10-11 ENCOUNTER — TELEPHONE (OUTPATIENT)
Dept: ALLERGY | Facility: CLINIC | Age: 33
End: 2023-10-11

## 2023-10-11 NOTE — TELEPHONE ENCOUNTER
Talked with patient about labs.  Referred back to primary. No additionnal A/I therapy at this time.

## 2023-10-16 ENCOUNTER — APPOINTMENT (OUTPATIENT)
Dept: ALLERGY | Facility: CLINIC | Age: 33
End: 2023-10-16

## 2023-11-27 ENCOUNTER — TELEPHONE (OUTPATIENT)
Dept: PRIMARY CARE | Facility: CLINIC | Age: 33
End: 2023-11-27

## 2023-11-27 ENCOUNTER — PATIENT MESSAGE (OUTPATIENT)
Dept: PRIMARY CARE | Facility: CLINIC | Age: 33
End: 2023-11-27

## 2023-11-27 NOTE — TELEPHONE ENCOUNTER
----- Message from KATHY Concepcion sent at 11/27/2023 10:37 AM EST -----  Regarding: FW: Epigastric pulse  Contact: [phone]  Can you triage patient ?     ----- Message -----  From: Ponce Sal  Sent: 11/27/2023   9:35 AM EST  To: KATHY Concepcion  Subject: FW: Epigastric pulse                               ----- Message -----  From: Mara Herbert  Sent: 11/27/2023   6:57 AM EST  To: #  Subject: Epigastric pulse                                 Hi Dr Morrissey,     Hope you had a great Thanksgiving. This morning I am having a pulse in my upper abdomen, no other symptoms…should I have it checked?     Faisal, geraldo

## 2023-11-27 NOTE — PATIENT COMMUNICATION
Pt returned my call & told the  that she was in a meeting until 5 & she can be reached after 5 or tomorrow

## 2023-12-01 NOTE — PATIENT COMMUNICATION
Pt said that she hasn't felt the pulse in her abdomen since that day, but it happened all morning & she has a family history of AAA. She scheduled an appt for tomorrow morning w/ BRENTON Farias.

## 2023-12-02 ENCOUNTER — OFFICE VISIT (OUTPATIENT)
Dept: PRIMARY CARE | Facility: CLINIC | Age: 33
End: 2023-12-02
Payer: COMMERCIAL

## 2023-12-02 VITALS
DIASTOLIC BLOOD PRESSURE: 82 MMHG | HEIGHT: 61 IN | HEART RATE: 91 BPM | RESPIRATION RATE: 16 BRPM | OXYGEN SATURATION: 95 % | BODY MASS INDEX: 29.83 KG/M2 | SYSTOLIC BLOOD PRESSURE: 117 MMHG | WEIGHT: 158 LBS

## 2023-12-02 DIAGNOSIS — R11.0 NAUSEA: ICD-10-CM

## 2023-12-02 DIAGNOSIS — Z82.49 FAMILY HISTORY OF ABDOMINAL AORTIC ANEURYSM (AAA): ICD-10-CM

## 2023-12-02 DIAGNOSIS — R09.89 AORTIC PULSATION IN ABDOMEN: Primary | ICD-10-CM

## 2023-12-02 LAB — PREGNANCY TEST URINE, POC: NEGATIVE

## 2023-12-02 PROCEDURE — 81025 URINE PREGNANCY TEST: CPT | Performed by: NURSE PRACTITIONER

## 2023-12-02 PROCEDURE — 1036F TOBACCO NON-USER: CPT | Performed by: NURSE PRACTITIONER

## 2023-12-02 PROCEDURE — 99213 OFFICE O/P EST LOW 20 MIN: CPT | Performed by: NURSE PRACTITIONER

## 2023-12-02 RX ORDER — FLUTICASONE PROPIONATE 50 MCG
2 SPRAY, SUSPENSION (ML) NASAL DAILY
COMMUNITY
Start: 2022-12-30

## 2023-12-02 RX ORDER — MODAFINIL 200 MG/1
TABLET ORAL 2 TIMES DAILY
COMMUNITY
Start: 2023-11-07 | End: 2024-05-17 | Stop reason: WASHOUT

## 2023-12-02 ASSESSMENT — ENCOUNTER SYMPTOMS
COUGH: 0
FEVER: 0
ABDOMINAL PAIN: 0
NAUSEA: 1
OCCASIONAL FEELINGS OF UNSTEADINESS: 0
CHILLS: 0
VOMITING: 0
LOSS OF SENSATION IN FEET: 0
SHORTNESS OF BREATH: 0

## 2023-12-02 ASSESSMENT — PATIENT HEALTH QUESTIONNAIRE - PHQ9
SUM OF ALL RESPONSES TO PHQ9 QUESTIONS 1 AND 2: 0
2. FEELING DOWN, DEPRESSED OR HOPELESS: NOT AT ALL
10. IF YOU CHECKED OFF ANY PROBLEMS, HOW DIFFICULT HAVE THESE PROBLEMS MADE IT FOR YOU TO DO YOUR WORK, TAKE CARE OF THINGS AT HOME, OR GET ALONG WITH OTHER PEOPLE: NOT DIFFICULT AT ALL
1. LITTLE INTEREST OR PLEASURE IN DOING THINGS: NOT AT ALL

## 2023-12-02 NOTE — PROGRESS NOTES
"Subjective   Chief Complaint: felt a pulse sensation in upper mid abdomen  (Comes and goes,  family history of AAA) and Nausea.    DONTRELL Herbert is a 33 y.o. female who presents for felt a pulse sensation in upper mid abdomen  (Comes and goes,  family history of AAA) and Nausea.    Patient presents with concern after feeling a pulsation in her upper abdomen 1 week ago and reports it occurred once more since that.  Patient also reports she had a similar sensation when she was pregnant in the past. She is worried because AAA run in her family.  Patient does not drink alcohol or use nicotine.   She also reports intermittent nausea over the past few weeks. LMP unknown as on continues birth control. Last sexual activity was 3 weeks ago.     Patient denies fever, vomiting,,chest pain, or shortness of breath.          Review of Systems   Constitutional:  Negative for chills and fever.   Respiratory:  Negative for cough and shortness of breath.    Cardiovascular:  Negative for chest pain.   Gastrointestinal:  Positive for nausea. Negative for abdominal pain and vomiting.       Objective   /82 (BP Location: Right arm, Patient Position: Sitting, BP Cuff Size: Adult)   Pulse 91   Resp 16   Ht 1.549 m (5' 1\")   Wt 71.7 kg (158 lb)   LMP  (LMP Unknown)   SpO2 95%   BMI 29.85 kg/m²   BSA Body surface area is 1.76 meters squared.      Physical Exam  Constitutional:       Appearance: Normal appearance.   Cardiovascular:      Rate and Rhythm: Normal rate and regular rhythm.      Pulses: Normal pulses.      Heart sounds: Normal heart sounds. No murmur heard.     No friction rub. No gallop.   Pulmonary:      Effort: Pulmonary effort is normal.      Breath sounds: Normal breath sounds.   Abdominal:      General: Abdomen is flat. There is no distension.      Palpations: Abdomen is soft. There is no mass.      Tenderness: There is no abdominal tenderness.   Neurological:      Mental Status: She is alert.       Lab " on 10/06/2023   Component Date Value Ref Range Status    IgG 10/06/2023 860  700 - 1,600 mg/dL Final    IgA 10/06/2023 59 (L)  70 - 400 mg/dL Final    IgM 10/06/2023 234 (H)  40 - 230 mg/dL Final    Serotype 1 10/06/2023 5.20  ug/mL Final    Serotype 2 10/06/2023 11.66  ug/mL Final    Serotype 3 10/06/2023 2.94  ug/mL Final    Serotype 4 10/06/2023 1.43  ug/mL Final    Serotype 5 10/06/2023 8.46  ug/mL Final    Serotype 8 10/06/2023 7.44  ug/mL Final    Serotype 9N 10/06/2023 0.91  ug/mL Final    Serotype 12F 10/06/2023 1.37  ug/mL Final    Serotype 14 10/06/2023 >35.84  ug/mL Final    Serotype 17F 10/06/2023 0.45  ug/mL Final    Serotype 19F 10/06/2023 11.22  ug/mL Final    Serotype 20 10/06/2023 0.74  ug/mL Final    Serotype 22F 10/06/2023 0.28  ug/mL Final    Serotype 23F 10/06/2023 0.25  ug/mL Final    Serotype 6B(26) 10/06/2023 0.72  ug/mL Final    Serotype 10A(34) 10/06/2023 1.59  ug/mL Final    Serotype 11A(43) 10/06/2023 1.11  ug/mL Final    Serotype 7F(51) 10/06/2023 8.17  ug/mL Final    Serotype 15B(54) 10/06/2023 0.89  ug/mL Final    Serotype 18C(56) 10/06/2023 0.20  ug/mL Final    Serotype 19A(57) 10/06/2023 1.57  ug/mL Final    Serotype 9V(68) 10/06/2023 10.48  ug/mL Final    Serotype 33F(70) 10/06/2023 0.21  ug/mL Final    Pneumo Serotype Interpretation 10/06/2023 See Note   Final    Comment: INTERPRETIVE INFORMATION: Streptococcus pneumoniae Antibodies, IgG    A pre- and postvaccination comparison is required to adequately   assess the humoral immune response to the pure polysaccharide   Pneumovax 23 (PNX) and/or the protein conjugated Prevnar 7 (P7),   Prevnar 13 (P13), Prevnar 20 (P20), and Vaxneuvance (V15)   Streptococcus pneumoniae vaccines. Prevaccination samples should   be collected prior to vaccine administration. Postvaccination   samples should be obtained at least 4 weeks after immunization.   Testing of postvaccination samples alone will provide only general   immune status of the  individual to various pneumococcal serotypes.    In the case of pure polysaccharide vaccine, indication of immune   system competence is further delineated as an adequate response to   at least 50 percent of the serotypes in the vaccine challenge for   those 2-5 years of age and to at least 70 percent of the serotypes   in the vaccine challenge for those 6-65 years of age. Individual                              immune response may vary based on age, past exposure,   immunocompetence, and pneumococcal serotype.    Responder Status           Antibody Ratio      Nonresponder ........... Less than twofold increase and                              postvaccination concentration                              less than 1.3 ug/mL      Good responder ......... At least a twofold increase                              and/or a postvaccination                              concentration greater than or                             equal to 1.3 ug/mL    A response to 50-70 percent or more of the serotypes in the   vaccine challenge is considered a normal humoral response.(Phylicia,   2014) Antibody concentration greater than 1.0-1.3 ug/mL is   generally considered long-term protection.(Phylicia, 2015)    References:    1. Phylicia GONZALEZ, Rojas WISEMAN, Goran X, et al. Multilaboratory   assessment of threshold versus fold-change algorithms for   minimizing analytical variability in multiplexed pneumococcal IgG                              measurements. Clin Vaccine Immunol. 2014;21(7):982-988.    2. Phylicia GONZALEZ, Luis Angel SHEN. Use and clinical interpretation of   pneumococcal antibody measurements in the evaluation of humoral   immune function. Clin Vaccine Immunol. 2015;22(2):148-152.    This test was developed and its performance characteristics   determined by [facility]. It has not been cleared or   approved by the U.S. Food and Drug Administration. This test was   performed in a CLIA-certified laboratory and is intended for   clinical  purposes.  Performed By: [email or web address]  [address] [place] [id number]  : Ede Reis MD, PhD  CLIA Number: [id number]    Thyroid Peroxidase (TPO) Antibody 10/06/2023 29  <=60 IU/mL Final   [facility] Encounter on 01/17/2023   Component Date Value Ref Range Status    Glucose 01/17/2023 87  74 - 99 mg/dL Final    Sodium 01/17/2023 140  136 - 145 mmol/L Final    Potassium 01/17/2023 4.3  3.5 - 5.3 mmol/L Final    Chloride 01/17/2023 107  98 - 107 mmol/L Final    Bicarbonate 01/17/2023 27  21 - 32 mmol/L Final    Anion Gap 01/17/2023 10  10 - 20 mmol/L Final    Urea Nitrogen 01/17/2023 13  6 - 23 mg/dL Final    Creatinine 01/17/2023 0.85  0.50 - 1.05 mg/dL Final    GFR Female 01/17/2023 >90  >90 mL/min/1.73m2 Final    Comment:  CALCULATIONS OF ESTIMATED GFR ARE PERFORMED   USING THE 2021 CKD-EPI STUDY REFIT EQUATION   WITHOUT THE RACE VARIABLE FOR THE IDMS-TRACEABLE   CREATININE METHODS.    https://jasn.asnjournals.org/content/early/2021/09/22/ASN.3708435538      Calcium 01/17/2023 9.1  8.6 - 10.6 mg/dL Final    Albumin 01/17/2023 3.7  3.4 - 5.0 g/dL Final    Alkaline Phosphatase 01/17/2023 50  33 - 110 U/L Final    Total Protein 01/17/2023 6.4  6.4 - 8.2 g/dL Final    AST 01/17/2023 17  9 - 39 U/L Final    Total Bilirubin 01/17/2023 0.8  0.0 - 1.2 mg/dL Final    ALT (SGPT) 01/17/2023 16  7 - 45 U/L Final    Comment:  Patients treated with Sulfasalazine may generate    falsely decreased results for ALT.      WBC 01/17/2023 6.4  4.4 - 11.3 x10E9/L Final    nRBC 01/17/2023 0.0  0.0 - 0.0 /100 WBC Final    RBC 01/17/2023 4.74  4.00 - 5.20 x10E12/L Final    Hemoglobin 01/17/2023 13.6  12.0 - 16.0 g/dL Final    Hematocrit 01/17/2023 40.9  36.0 - 46.0 % Final    MCV 01/17/2023 86  80 - 100 fL Final    MCHC 01/17/2023 33.3  32.0 - 36.0 g/dL Final    Platelets 01/17/2023 258  150 - 450 x10E9/L Final    RDW 01/17/2023 12.5  11.5 - 14.5 % Final    Neutrophils % 01/17/2023 56.7  40.0 - 80.0 %  Final    Immature Granulocytes %, Automated 01/17/2023 0.3  0.0 - 0.9 % Final    Comment:  Immature Granulocyte Count (IG) includes promyelocytes,    myelocytes and metamyelocytes but does not include bands.   Percent differential counts (%) should be interpreted in the   context of the absolute cell counts (cells/L).      Lymphocytes % 01/17/2023 31.3  13.0 - 44.0 % Final    Monocytes % 01/17/2023 7.0  2.0 - 10.0 % Final    Eosinophils % 01/17/2023 4.2  0.0 - 6.0 % Final    Basophils % 01/17/2023 0.5  0.0 - 2.0 % Final    Neutrophils Absolute 01/17/2023 3.63  1.20 - 7.70 x10E9/L Final    Lymphocytes Absolute 01/17/2023 2.00  1.20 - 4.80 x10E9/L Final    Monocytes Absolute 01/17/2023 0.45  0.10 - 1.00 x10E9/L Final    Eosinophils Absolute 01/17/2023 0.27  0.00 - 0.70 x10E9/L Final    Basophils Absolute 01/17/2023 0.03  0.00 - 0.10 x10E9/L Final    TSH 01/17/2023 1.37  0.44 - 3.98 mIU/L Final    Comment:  TSH testing is performed using different testing    methodology at [facility] than at other    [facility]. Direct result comparisons should    only be made within the same method.      Cholesterol 01/17/2023 166  0 - 199 mg/dL Final    Comment: .      AGE      DESIRABLE   BORDERLINE HIGH   HIGH     0-19 Y     0 - 169       170 - 199     >/= 200    20-24 Y     0 - 189       190 - 224     >/= 225         >24 Y     0 - 199       200 - 239     >/= 240   **All ranges are based on fasting samples. Specific   therapeutic targets will vary based on patient-specific   cardiac risk.  .   Pediatric guidelines reference:Pediatrics 2011, 128(S5).   Adult guidelines reference: NCEP ATPIII Guidelines,     ZENAIDA 2001, 258:2486-97  .   Venipuncture immediately after or during the    administration of Metamizole may lead to falsely   low results. Testing should be performed immediately   prior to Metamizole dosing.      HDL 01/17/2023 68.0  mg/dL Final    Comment: .      AGE      VERY LOW   LOW     NORMAL    HIGH        0-19 Y       < 35   < 40     40-45     ----    20-24 Y       ----   < 40       >45     ----      >24 Y       ----   < 40     40-60      >60  .      Cholesterol/HDL Ratio 01/17/2023 2.4   Final    Comment: REF VALUES  DESIRABLE  < 3.4  HIGH RISK  > 5.0      LDL 01/17/2023 81  0 - 99 mg/dL Final    Comment: .                           NEAR      BORD      AGE      DESIRABLE  OPTIMAL    HIGH     HIGH     VERY HIGH     0-19 Y     0 - 109     ---    110-129   >/= 130     ----    20-24 Y     0 - 119     ---    120-159   >/= 160     ----      >24 Y     0 -  99   100-129  130-159   160-189     >/=190  .      VLDL 01/17/2023 17  0 - 40 mg/dL Final    Triglycerides 01/17/2023 83  0 - 149 mg/dL Final    Comment: .      AGE      DESIRABLE   BORDERLINE HIGH   HIGH     VERY HIGH   0 D-90 D    19 - 174         ----         ----        ----  91 D- 9 Y     0 -  74        75 -  99     >/= 100      ----    10-19 Y     0 -  89        90 - 129     >/= 130      ----    20-24 Y     0 - 114       115 - 149     >/= 150      ----         >24 Y     0 - 149       150 - 199    200- 499    >/= 500  .   Venipuncture immediately after or during the    administration of Metamizole may lead to falsely   low results. Testing should be performed immediately   prior to Metamizole dosing.      Vitamin D, 25-Hydroxy 01/17/2023 31  ng/mL Final    Comment: .  DEFICIENCY:         < 20   NG/ML  INSUFFICIENCY:      20-29  NG/ML  SUFFICIENCY:         NG/ML    THIS ASSAY ACCURATELY QUANTIFIES THE SUM OF  VITAMIN D3, 25-HYDROXY AND VIT D2,25-HYDROXY.       Current Outpatient Medications on File Prior to Visit   Medication Sig Dispense Refill    albuterol 90 mcg/actuation inhaler Inhale 2 puffs every 4 hours if needed for shortness of breath.      cholecalciferol (Vitamin D-3) 125 MCG (5000 UT) capsule Take 1 capsule (125 mcg) by mouth once daily.      drospirenone-ethinyl estradioL (Crystal Yeboah) 3-0.02 mg tablet Take 1 tablet by mouth once daily.       fluticasone (Flonase) 50 mcg/actuation nasal spray Administer 2 sprays into affected nostril(s) once daily.      fluticasone propion-salmeteroL (Advair Diskus) 100-50 mcg/dose diskus inhaler Inhale 1 puff every 12 hours.      levothyroxine (Synthroid, Levoxyl) 25 mcg tablet 1 tablet per day except Saturday and Sunday then pt takes 2 tablets 90 tablet 1    modafinil (Provigil) 200 mg tablet Take by mouth 2 times a day.       No current facility-administered medications on file prior to visit.     No images are attached to the encounter.            Assessment/Plan   Problem List Items Addressed This Visit             ICD-10-CM    Family history of abdominal aortic aneurysm (AAA) Z82.49    Relevant Orders    Abdominal Ultrasound    Aortic pulsation in abdomen - Primary R09.89     Patient is interested in doing abdominal US to rule out aneurysm  IO pregnancy test negative          Relevant Orders    Abdominal Ultrasound     Other Visit Diagnoses         Codes    Nausea     R11.0    Relevant Orders    HCG, quantitative, pregnancy    POCT Pregnancy, Urine manually resulted (Completed)

## 2024-01-29 DIAGNOSIS — E03.9 ACQUIRED HYPOTHYROIDISM: ICD-10-CM

## 2024-01-29 RX ORDER — LEVOTHYROXINE SODIUM 25 UG/1
TABLET ORAL
Qty: 102 TABLET | Refills: 1 | Status: SHIPPED | OUTPATIENT
Start: 2024-01-29

## 2024-04-08 DIAGNOSIS — R09.89 AORTIC PULSATION IN ABDOMEN: ICD-10-CM

## 2024-04-11 ENCOUNTER — HOSPITAL ENCOUNTER (OUTPATIENT)
Dept: RADIOLOGY | Facility: CLINIC | Age: 34
Discharge: HOME | End: 2024-04-11
Payer: COMMERCIAL

## 2024-04-11 DIAGNOSIS — R09.89 AORTIC PULSATION IN ABDOMEN: ICD-10-CM

## 2024-04-11 PROCEDURE — 76700 US EXAM ABDOM COMPLETE: CPT

## 2024-04-11 PROCEDURE — 76700 US EXAM ABDOM COMPLETE: CPT | Performed by: RADIOLOGY

## 2024-04-16 ENCOUNTER — TELEPHONE (OUTPATIENT)
Dept: PRIMARY CARE | Facility: CLINIC | Age: 34
End: 2024-04-16
Payer: COMMERCIAL

## 2024-05-17 ENCOUNTER — LAB (OUTPATIENT)
Dept: LAB | Facility: LAB | Age: 34
End: 2024-05-17
Payer: COMMERCIAL

## 2024-05-17 ENCOUNTER — OFFICE VISIT (OUTPATIENT)
Dept: PRIMARY CARE | Facility: CLINIC | Age: 34
End: 2024-05-17
Payer: COMMERCIAL

## 2024-05-17 VITALS
DIASTOLIC BLOOD PRESSURE: 78 MMHG | HEIGHT: 61 IN | WEIGHT: 146.8 LBS | BODY MASS INDEX: 27.72 KG/M2 | HEART RATE: 88 BPM | SYSTOLIC BLOOD PRESSURE: 114 MMHG

## 2024-05-17 DIAGNOSIS — R73.09 ABNORMAL GLUCOSE: ICD-10-CM

## 2024-05-17 DIAGNOSIS — E55.9 VITAMIN D DEFICIENCY: ICD-10-CM

## 2024-05-17 DIAGNOSIS — F41.9 ANXIETY: ICD-10-CM

## 2024-05-17 DIAGNOSIS — Z00.00 HEALTHCARE MAINTENANCE: ICD-10-CM

## 2024-05-17 DIAGNOSIS — G51.4 FACIAL TWITCHING: ICD-10-CM

## 2024-05-17 DIAGNOSIS — E03.8 OTHER SPECIFIED HYPOTHYROIDISM: ICD-10-CM

## 2024-05-17 DIAGNOSIS — Z00.00 HEALTHCARE MAINTENANCE: Primary | ICD-10-CM

## 2024-05-17 PROCEDURE — 83036 HEMOGLOBIN GLYCOSYLATED A1C: CPT

## 2024-05-17 PROCEDURE — 80061 LIPID PANEL: CPT

## 2024-05-17 PROCEDURE — 36415 COLL VENOUS BLD VENIPUNCTURE: CPT

## 2024-05-17 PROCEDURE — 80053 COMPREHEN METABOLIC PANEL: CPT

## 2024-05-17 PROCEDURE — 82306 VITAMIN D 25 HYDROXY: CPT

## 2024-05-17 PROCEDURE — 84443 ASSAY THYROID STIM HORMONE: CPT

## 2024-05-17 PROCEDURE — 85025 COMPLETE CBC W/AUTO DIFF WBC: CPT

## 2024-05-17 PROCEDURE — 1036F TOBACCO NON-USER: CPT | Performed by: FAMILY MEDICINE

## 2024-05-17 PROCEDURE — 99395 PREV VISIT EST AGE 18-39: CPT | Performed by: FAMILY MEDICINE

## 2024-05-17 RX ORDER — ATOMOXETINE 80 MG/1
80 CAPSULE ORAL DAILY
COMMUNITY
Start: 2024-05-13

## 2024-05-17 ASSESSMENT — ENCOUNTER SYMPTOMS
DIZZINESS: 0
FATIGUE: 0
ACTIVITY CHANGE: 0
LIGHT-HEADEDNESS: 0
COLOR CHANGE: 0
APPETITE CHANGE: 0
FACIAL ASYMMETRY: 0
CHOKING: 0
BACK PAIN: 0
HEADACHES: 0
PALPITATIONS: 0
FEVER: 0
COUGH: 0
ARTHRALGIAS: 0
CHEST TIGHTNESS: 0

## 2024-05-17 NOTE — PROGRESS NOTES
"Subjective   Patient ID: Mara Herbert is a 34 y.o. female who presents for Annual Exam.    HPI   Patient presents for physical exam. Patient has been diagnosed with hypothyroidism and vitamin d deficiency. She had previously had elevation in one hour glucose tolerance test but 3 hour normal.     Fam Hx  Mom () living,  Dad () living,  PGM colon polyps not cancer     Exercise walks, yoga, runs  ETOH denies, once a month  Caffeine 16 oz coffee/day  Tobacco denies     OB/GYN Dr. Talbert     Mammogram @ 41  DEXA @ 66  EGD, Colonoscopy 2017 Dr. Sewell not due until 45-50      Past Med Hx  Hypothyroidism  Vitamin D deficiency  Eosinophilic esophagitis (resolved)  severe asthma (resolved)     Past Surg Hx  T & A  TMJ surgery  2      Patient denies other complaints.     Review of Systems   Constitutional:  Negative for activity change, appetite change, fatigue and fever.   HENT:  Negative for congestion.    Respiratory:  Negative for cough, choking and chest tightness.    Cardiovascular:  Negative for chest pain, palpitations and leg swelling.   Musculoskeletal:  Negative for arthralgias, back pain and gait problem.   Skin:  Negative for color change and pallor.   Neurological:  Negative for dizziness, facial asymmetry, light-headedness and headaches.       Objective   /78 (BP Location: Right arm)   Pulse 88   Ht 1.549 m (5' 1\")   Wt 66.6 kg (146 lb 12.8 oz)   BMI 27.74 kg/m²   BSA Body surface area is 1.69 meters squared.      Physical Exam  Constitutional:       General: She is not in acute distress.     Appearance: Normal appearance. She is not toxic-appearing.   HENT:      Head: Normocephalic.      Right Ear: Tympanic membrane, ear canal and external ear normal.      Left Ear: Tympanic membrane, ear canal and external ear normal.   Eyes:      Conjunctiva/sclera: Conjunctivae normal.      Pupils: Pupils are equal, round, and reactive to light.   Cardiovascular:      Rate and Rhythm: Normal rate and " regular rhythm.      Pulses: Normal pulses.      Heart sounds: Normal heart sounds.   Pulmonary:      Effort: No respiratory distress.      Breath sounds: No wheezing, rhonchi or rales.   Abdominal:      General: Bowel sounds are normal. There is no distension.      Palpations: Abdomen is soft.      Tenderness: There is no abdominal tenderness.   Musculoskeletal:         General: No swelling or tenderness.   Skin:     Findings: No lesion or rash.   Neurological:      General: No focal deficit present.      Mental Status: She is alert and oriented to person, place, and time. Mental status is at baseline.      Gait: Gait normal.   Psychiatric:         Mood and Affect: Mood normal.         Behavior: Behavior normal.         Thought Content: Thought content normal.         Judgment: Judgment normal.       Office Visit on 12/02/2023   Component Date Value Ref Range Status    Preg Test, Ur 12/02/2023 Negative  Negative Final   Lab on 10/06/2023   Component Date Value Ref Range Status    IgG 10/06/2023 860  700 - 1,600 mg/dL Final    IgA 10/06/2023 59 (L)  70 - 400 mg/dL Final    IgM 10/06/2023 234 (H)  40 - 230 mg/dL Final    Serotype 1 10/06/2023 5.20  ug/mL Final    Serotype 2 10/06/2023 11.66  ug/mL Final    Serotype 3 10/06/2023 2.94  ug/mL Final    Serotype 4 10/06/2023 1.43  ug/mL Final    Serotype 5 10/06/2023 8.46  ug/mL Final    Serotype 8 10/06/2023 7.44  ug/mL Final    Serotype 9N 10/06/2023 0.91  ug/mL Final    Serotype 12F 10/06/2023 1.37  ug/mL Final    Serotype 14 10/06/2023 >35.84  ug/mL Final    Serotype 17F 10/06/2023 0.45  ug/mL Final    Serotype 19F 10/06/2023 11.22  ug/mL Final    Serotype 20 10/06/2023 0.74  ug/mL Final    Serotype 22F 10/06/2023 0.28  ug/mL Final    Serotype 23F 10/06/2023 0.25  ug/mL Final    Serotype 6B(26) 10/06/2023 0.72  ug/mL Final    Serotype 10A(34) 10/06/2023 1.59  ug/mL Final    Serotype 11A(43) 10/06/2023 1.11  ug/mL Final    Serotype 7F(51) 10/06/2023 8.17  ug/mL Final     Serotype 15B(54) 10/06/2023 0.89  ug/mL Final    Serotype 18C(56) 10/06/2023 0.20  ug/mL Final    Serotype 19A(57) 10/06/2023 1.57  ug/mL Final    Serotype 9V(68) 10/06/2023 10.48  ug/mL Final    Serotype 33F(70) 10/06/2023 0.21  ug/mL Final    Pneumo Serotype Interpretation 10/06/2023 See Note   Final    Comment: INTERPRETIVE INFORMATION: Streptococcus pneumoniae Antibodies, IgG    A pre- and postvaccination comparison is required to adequately   assess the humoral immune response to the pure polysaccharide   Pneumovax 23 (PNX) and/or the protein conjugated Prevnar 7 (P7),   Prevnar 13 (P13), Prevnar 20 (P20), and Vaxneuvance (V15)   Streptococcus pneumoniae vaccines. Prevaccination samples should   be collected prior to vaccine administration. Postvaccination   samples should be obtained at least 4 weeks after immunization.   Testing of postvaccination samples alone will provide only general   immune status of the individual to various pneumococcal serotypes.    In the case of pure polysaccharide vaccine, indication of immune   system competence is further delineated as an adequate response to   at least 50 percent of the serotypes in the vaccine challenge for   those 2-5 years of age and to at least 70 percent of the serotypes   in the vaccine challenge for those 6-65 years of age. Individual                              immune response may vary based on age, past exposure,   immunocompetence, and pneumococcal serotype.    Responder Status           Antibody Ratio      Nonresponder ........... Less than twofold increase and                              postvaccination concentration                              less than 1.3 ug/mL      Good responder ......... At least a twofold increase                              and/or a postvaccination                              concentration greater than or                             equal to 1.3 ug/mL    A response to 50-70 percent or more of the serotypes in the    vaccine challenge is considered a normal humoral response.(Phylicia,   2014) Antibody concentration greater than 1.0-1.3 ug/mL is   generally considered long-term protection.(Phylicia, 2015)    References:    1. Phylicia GONZALEZ, Rojas JW, Goran X, et al. Multilaboratory   assessment of threshold versus fold-change algorithms for   minimizing analytical variability in multiplexed pneumococcal IgG                              measurements. Clin Vaccine Immunol. 2014;21(7):982-988.    2. Phylicia GONZALEZ, Luis Angel SHEN. Use and clinical interpretation of   pneumococcal antibody measurements in the evaluation of humoral   immune function. Clin Vaccine Immunol. 2015;22(2):148-152.    This test was developed and its performance characteristics   determined by [facility]. It has not been cleared or   approved by the U.S. Food and Drug Administration. This test was   performed in a CLIA-certified laboratory and is intended for   clinical purposes.  Performed By: [facility]  [address] [place] [id number]  : Ede Reis MD, PhD  CLIA Number: [id number]    Thyroid Peroxidase (TPO) Antibody 10/06/2023 29  <=60 IU/mL Final     Current Outpatient Medications on File Prior to Visit   Medication Sig Dispense Refill    albuterol 90 mcg/actuation inhaler Inhale 2 puffs every 4 hours if needed for shortness of breath.      atomoxetine (Strattera) 80 mg capsule Take 1 capsule (80 mg) by mouth once daily.      cholecalciferol (Vitamin D-3) 125 MCG (5000 UT) capsule Take 1 capsule (125 mcg) by mouth once daily.      drospirenone-ethinyl estradioL (Edilia, Gianvi) 3-0.02 mg tablet Take 1 tablet by mouth once daily.      fluticasone (Flonase) 50 mcg/actuation nasal spray Administer 2 sprays into affected nostril(s) once daily.      fluticasone propion-salmeteroL (Advair Diskus) 100-50 mcg/dose diskus inhaler Inhale 1 puff every 12 hours.      levothyroxine (Synthroid, Levoxyl) 25 mcg tablet 1 tablet per day except  Saturday and Sunday then pt takes 2 tablets 102 tablet 1    [DISCONTINUED] modafinil (Provigil) 200 mg tablet Take by mouth 2 times a day.       No current facility-administered medications on file prior to visit.     No images are attached to the encounter.            Assessment/Plan   Diagnoses and all orders for this visit:  Healthcare maintenance  Abnormal glucose  Other specified hypothyroidism  Vitamin D deficiency  Anxiety  Facial twitching      1. Patient's blood work unavailable at this office visit  2. Patient's LDL goal less than 130 current LDL available  3. Patient's glucose is unavailable  4. Mammogram @ 41  5. DEXA @ 66  6. EGD, Colonoscopy 2017 Dr. Sewell not due until 45-50   7. Patient to call if questions or concerns

## 2024-05-18 LAB
25(OH)D3 SERPL-MCNC: 31 NG/ML (ref 30–100)
ALBUMIN SERPL BCP-MCNC: 4.3 G/DL (ref 3.4–5)
ALP SERPL-CCNC: 45 U/L (ref 33–110)
ALT SERPL W P-5'-P-CCNC: 21 U/L (ref 7–45)
ANION GAP SERPL CALC-SCNC: 14 MMOL/L (ref 10–20)
AST SERPL W P-5'-P-CCNC: 19 U/L (ref 9–39)
BASOPHILS # BLD AUTO: 0.03 X10*3/UL (ref 0–0.1)
BASOPHILS NFR BLD AUTO: 0.5 %
BILIRUB SERPL-MCNC: 0.8 MG/DL (ref 0–1.2)
BUN SERPL-MCNC: 12 MG/DL (ref 6–23)
CALCIUM SERPL-MCNC: 9.1 MG/DL (ref 8.6–10.6)
CHLORIDE SERPL-SCNC: 106 MMOL/L (ref 98–107)
CHOLEST SERPL-MCNC: 183 MG/DL (ref 0–199)
CHOLESTEROL/HDL RATIO: 2.6
CO2 SERPL-SCNC: 23 MMOL/L (ref 21–32)
CREAT SERPL-MCNC: 0.74 MG/DL (ref 0.5–1.05)
EGFRCR SERPLBLD CKD-EPI 2021: >90 ML/MIN/1.73M*2
EOSINOPHIL # BLD AUTO: 0.06 X10*3/UL (ref 0–0.7)
EOSINOPHIL NFR BLD AUTO: 1 %
ERYTHROCYTE [DISTWIDTH] IN BLOOD BY AUTOMATED COUNT: 12.4 % (ref 11.5–14.5)
EST. AVERAGE GLUCOSE BLD GHB EST-MCNC: 85 MG/DL
GLUCOSE SERPL-MCNC: 80 MG/DL (ref 74–99)
HBA1C MFR BLD: 4.6 %
HCT VFR BLD AUTO: 42.4 % (ref 36–46)
HDLC SERPL-MCNC: 70.3 MG/DL
HGB BLD-MCNC: 13.8 G/DL (ref 12–16)
IMM GRANULOCYTES # BLD AUTO: 0.01 X10*3/UL (ref 0–0.7)
IMM GRANULOCYTES NFR BLD AUTO: 0.2 % (ref 0–0.9)
LDLC SERPL CALC-MCNC: 94 MG/DL
LYMPHOCYTES # BLD AUTO: 1.72 X10*3/UL (ref 1.2–4.8)
LYMPHOCYTES NFR BLD AUTO: 27.3 %
MCH RBC QN AUTO: 28.3 PG (ref 26–34)
MCHC RBC AUTO-ENTMCNC: 32.5 G/DL (ref 32–36)
MCV RBC AUTO: 87 FL (ref 80–100)
MONOCYTES # BLD AUTO: 0.32 X10*3/UL (ref 0.1–1)
MONOCYTES NFR BLD AUTO: 5.1 %
NEUTROPHILS # BLD AUTO: 4.15 X10*3/UL (ref 1.2–7.7)
NEUTROPHILS NFR BLD AUTO: 65.9 %
NON HDL CHOLESTEROL: 113 MG/DL (ref 0–149)
NRBC BLD-RTO: 0 /100 WBCS (ref 0–0)
PLATELET # BLD AUTO: 276 X10*3/UL (ref 150–450)
POTASSIUM SERPL-SCNC: 4.3 MMOL/L (ref 3.5–5.3)
PROT SERPL-MCNC: 7 G/DL (ref 6.4–8.2)
RBC # BLD AUTO: 4.88 X10*6/UL (ref 4–5.2)
SODIUM SERPL-SCNC: 139 MMOL/L (ref 136–145)
TRIGL SERPL-MCNC: 92 MG/DL (ref 0–149)
TSH SERPL-ACNC: 1.33 MIU/L (ref 0.44–3.98)
VLDL: 18 MG/DL (ref 0–40)
WBC # BLD AUTO: 6.3 X10*3/UL (ref 4.4–11.3)

## 2024-06-07 ENCOUNTER — TELEMEDICINE (OUTPATIENT)
Dept: PHARMACY | Facility: HOSPITAL | Age: 34
End: 2024-06-07
Payer: COMMERCIAL

## 2024-06-07 DIAGNOSIS — G51.4 FACIAL TWITCHING: ICD-10-CM

## 2024-06-07 NOTE — PROGRESS NOTES
Subjective   Patient ID: Mara Herbert is a 34 y.o. female who presents for Medication Problem.    Referring Provider: DO DONTRELL Ackerman  Patient reports facial twitching that, upon investigation, started around the time that she began atomoxetine 40 mg. This issue has continued since increasing to 80 mg. The patient does report clinical benefit since starting atomoxetine.     Review of Systems    Medication System Management:  Affordability/Accessibility: none identified  Adherence/Organization: excellent  Adverse Effects: as above    Objective     There were no vitals taken for this visit.     Labs  Lab Results   Component Value Date    BILITOT 0.8 05/17/2024    CALCIUM 9.1 05/17/2024    CO2 23 05/17/2024     05/17/2024    CREATININE 0.74 05/17/2024    GLUCOSE 80 05/17/2024    ALKPHOS 45 05/17/2024    K 4.3 05/17/2024    PROT 7.0 05/17/2024     05/17/2024    AST 19 05/17/2024    ALT 21 05/17/2024    BUN 12 05/17/2024    ANIONGAP 14 05/17/2024    ALBUMIN 4.3 05/17/2024    GFRF >90 01/17/2023     Lab Results   Component Value Date    TRIG 92 05/17/2024    CHOL 183 05/17/2024    LDLCALC 94 05/17/2024    HDL 70.3 05/17/2024     Lab Results   Component Value Date    HGBA1C 4.6 05/17/2024       Current Outpatient Medications on File Prior to Visit   Medication Sig Dispense Refill    albuterol 90 mcg/actuation inhaler Inhale 2 puffs every 4 hours if needed for shortness of breath.      atomoxetine (Strattera) 80 mg capsule Take 1 capsule (80 mg) by mouth once daily.      cholecalciferol (Vitamin D-3) 125 MCG (5000 UT) capsule Take 1 capsule (125 mcg) by mouth once daily.      drospirenone-ethinyl estradioL (Edilia, Gianvi) 3-0.02 mg tablet Take 1 tablet by mouth once daily.      fluticasone (Flonase) 50 mcg/actuation nasal spray Administer 2 sprays into affected nostril(s) once daily.      fluticasone propion-salmeteroL (Advair Diskus) 100-50 mcg/dose diskus inhaler Inhale 1 puff every 12 hours.       levothyroxine (Synthroid, Levoxyl) 25 mcg tablet 1 tablet per day except Saturday and Sunday then pt takes 2 tablets 102 tablet 1     No current facility-administered medications on file prior to visit.        Assessment/Plan   Problem List Items Addressed This Visit    None  Visit Diagnoses       Facial twitching            Tremor is reported to be experienced by about 5-10% of patients on atomoxetine. This is the most likely culprit (others ruled out include albuterol, levothyroxine, and supplements). She will discuss costs and benefits with that prescriber.     Siddhartha Clement, PharmD    Continue all meds under the continuation of care with the referring provider and clinical pharmacy team.

## 2024-06-20 DIAGNOSIS — F90.9 ATTENTION DEFICIT HYPERACTIVITY DISORDER (ADHD), UNSPECIFIED ADHD TYPE: ICD-10-CM

## 2024-06-20 RX ORDER — ATOMOXETINE 40 MG/1
40 CAPSULE ORAL 2 TIMES DAILY
Qty: 60 CAPSULE | Refills: 0 | Status: SHIPPED | OUTPATIENT
Start: 2024-06-20

## 2024-07-16 ENCOUNTER — TELEPHONE (OUTPATIENT)
Dept: PRIMARY CARE | Facility: CLINIC | Age: 34
End: 2024-07-16
Payer: COMMERCIAL

## 2024-07-16 NOTE — TELEPHONE ENCOUNTER
Fax request from [facility] requesting a refill on:    atomoxetine (Strattera) 40 mg capsule   Take 1 capsule (40 mg) by mouth 2 times a day.   Quantity: 60 capsule     [id number] - [address] [address]  Phone: [phone]  Fax: [phone]

## 2024-07-17 DIAGNOSIS — F90.9 ATTENTION DEFICIT HYPERACTIVITY DISORDER (ADHD), UNSPECIFIED ADHD TYPE: ICD-10-CM

## 2024-07-17 RX ORDER — ATOMOXETINE 40 MG/1
40 CAPSULE ORAL 2 TIMES DAILY
Qty: 180 CAPSULE | Refills: 1 | Status: SHIPPED | OUTPATIENT
Start: 2024-07-17 | End: 2025-01-13

## 2024-08-19 DIAGNOSIS — Z00.00 HEALTHCARE MAINTENANCE: ICD-10-CM

## 2024-08-20 ENCOUNTER — LAB (OUTPATIENT)
Dept: LAB | Facility: LAB | Age: 34
End: 2024-08-20
Payer: COMMERCIAL

## 2024-08-20 DIAGNOSIS — Z00.00 HEALTHCARE MAINTENANCE: ICD-10-CM

## 2024-08-20 PROCEDURE — 36415 COLL VENOUS BLD VENIPUNCTURE: CPT

## 2024-08-20 PROCEDURE — 85025 COMPLETE CBC W/AUTO DIFF WBC: CPT

## 2024-08-21 LAB
BASOPHILS # BLD AUTO: 0.03 X10*3/UL (ref 0–0.1)
BASOPHILS NFR BLD AUTO: 0.5 %
EOSINOPHIL # BLD AUTO: 0.04 X10*3/UL (ref 0–0.7)
EOSINOPHIL NFR BLD AUTO: 0.7 %
ERYTHROCYTE [DISTWIDTH] IN BLOOD BY AUTOMATED COUNT: 12.2 % (ref 11.5–14.5)
HCT VFR BLD AUTO: 41.9 % (ref 36–46)
HGB BLD-MCNC: 13.8 G/DL (ref 12–16)
IMM GRANULOCYTES # BLD AUTO: 0.02 X10*3/UL (ref 0–0.7)
IMM GRANULOCYTES NFR BLD AUTO: 0.3 % (ref 0–0.9)
LYMPHOCYTES # BLD AUTO: 1.28 X10*3/UL (ref 1.2–4.8)
LYMPHOCYTES NFR BLD AUTO: 21.5 %
MCH RBC QN AUTO: 27.9 PG (ref 26–34)
MCHC RBC AUTO-ENTMCNC: 32.9 G/DL (ref 32–36)
MCV RBC AUTO: 85 FL (ref 80–100)
MONOCYTES # BLD AUTO: 0.48 X10*3/UL (ref 0.1–1)
MONOCYTES NFR BLD AUTO: 8.1 %
NEUTROPHILS # BLD AUTO: 4.1 X10*3/UL (ref 1.2–7.7)
NEUTROPHILS NFR BLD AUTO: 68.9 %
NRBC BLD-RTO: 0 /100 WBCS (ref 0–0)
PLATELET # BLD AUTO: 258 X10*3/UL (ref 150–450)
RBC # BLD AUTO: 4.94 X10*6/UL (ref 4–5.2)
WBC # BLD AUTO: 6 X10*3/UL (ref 4.4–11.3)

## 2024-09-12 ENCOUNTER — TELEPHONE (OUTPATIENT)
Dept: PRIMARY CARE | Facility: CLINIC | Age: 34
End: 2024-09-12
Payer: COMMERCIAL

## 2024-09-12 DIAGNOSIS — E03.9 ACQUIRED HYPOTHYROIDISM: ICD-10-CM

## 2024-09-12 NOTE — TELEPHONE ENCOUNTER
Pt called in requesting a refill on:    levothyroxine (Synthroid, Levoxyl) 25 mcg tablet   1 tablet per day except Saturday and Sunday then pt takes 2 tablets   Quantity: 102 tablet       [id number] - [facility] [address] [facility]  [address] [facility] [address]  Phone: [phone]  Fax: [phone]

## 2024-09-13 RX ORDER — LEVOTHYROXINE SODIUM 25 UG/1
TABLET ORAL
Qty: 102 TABLET | Refills: 0 | Status: SHIPPED | OUTPATIENT
Start: 2024-09-13

## 2024-10-30 DIAGNOSIS — J31.0 CHRONIC RHINITIS: ICD-10-CM

## 2024-10-30 RX ORDER — PREDNISONE 5 MG/1
TABLET ORAL
Qty: 55 TABLET | Refills: 0 | Status: SHIPPED | OUTPATIENT
Start: 2024-10-30 | End: 2024-11-09

## 2024-11-08 ENCOUNTER — HOSPITAL ENCOUNTER (OUTPATIENT)
Dept: RADIOLOGY | Facility: CLINIC | Age: 34
Discharge: HOME | End: 2024-11-08
Payer: COMMERCIAL

## 2024-11-08 ENCOUNTER — TELEMEDICINE (OUTPATIENT)
Dept: PRIMARY CARE | Facility: CLINIC | Age: 34
End: 2024-11-08
Payer: COMMERCIAL

## 2024-11-08 DIAGNOSIS — J20.9 ACUTE BRONCHITIS, UNSPECIFIED ORGANISM: ICD-10-CM

## 2024-11-08 DIAGNOSIS — J20.9 ACUTE BRONCHITIS, UNSPECIFIED ORGANISM: Primary | ICD-10-CM

## 2024-11-08 PROCEDURE — 1036F TOBACCO NON-USER: CPT | Performed by: FAMILY MEDICINE

## 2024-11-08 PROCEDURE — 99214 OFFICE O/P EST MOD 30 MIN: CPT | Performed by: FAMILY MEDICINE

## 2024-11-08 PROCEDURE — 71046 X-RAY EXAM CHEST 2 VIEWS: CPT

## 2024-11-08 RX ORDER — AZITHROMYCIN 500 MG/1
500 TABLET, FILM COATED ORAL DAILY
Qty: 5 TABLET | Refills: 0 | Status: SHIPPED | OUTPATIENT
Start: 2024-11-08 | End: 2024-11-13

## 2024-11-08 RX ORDER — BENZONATATE 100 MG/1
100 CAPSULE ORAL 3 TIMES DAILY PRN
Qty: 42 CAPSULE | Refills: 0 | Status: SHIPPED | OUTPATIENT
Start: 2024-11-08 | End: 2024-12-08

## 2024-11-08 ASSESSMENT — ENCOUNTER SYMPTOMS
CHILLS: 0
HEADACHES: 0
COUGH: 1
SORE THROAT: 0
RHINORRHEA: 0
SWEATS: 0
WHEEZING: 0
SHORTNESS OF BREATH: 1
HEARTBURN: 0
WEIGHT LOSS: 0
MYALGIAS: 0
FEVER: 0
HEMOPTYSIS: 0

## 2024-11-08 NOTE — PROGRESS NOTES
Subjective   Patient ID: Mara Herbert is a 34 y.o. female who presents for No chief complaint on file..  I performed this visit using real-time telehealth tools, including an audio/video connection between Mara Herbert location: [place]  and Lyn Morrissey DO location : [place]    Cough  This is a recurrent problem. The current episode started 1 to 4 weeks ago. The problem has been waxing and waning. The problem occurs hourly. The cough is Non-productive and productive of sputum. Associated symptoms include ear pain, postnasal drip and shortness of breath. Pertinent negatives include no chest pain, chills, ear congestion, fever, headaches, heartburn, hemoptysis, myalgias, nasal congestion, rash, rhinorrhea, sore throat, sweats, weight loss or wheezing. Nothing aggravates the symptoms.      Patient reports thas she was having cough, chest congestion. In the am she feels that her chest is tighter. She is getting green yellow phlegm. She is not having sore throat at this time. She does feel congested today.  Ear pain is improved.     Review of Systems   Constitutional:  Negative for chills, fever and weight loss.   HENT:  Positive for ear pain and postnasal drip. Negative for rhinorrhea and sore throat.    Respiratory:  Positive for cough and shortness of breath. Negative for hemoptysis and wheezing.    Cardiovascular:  Negative for chest pain.   Gastrointestinal:  Negative for heartburn.   Musculoskeletal:  Negative for myalgias.   Skin:  Negative for rash.   Neurological:  Negative for headaches.       Objective   There were no vitals taken for this visit.  BSA There is no height or weight on file to calculate BSA.      Physical Exam  Constitutional:       Appearance: Normal appearance.   Neurological:      Mental Status: She is alert.       Lab on 08/20/2024   Component Date Value Ref Range Status    WBC 08/20/2024 6.0  4.4 - 11.3 x10*3/uL Final    nRBC 08/20/2024 0.0  0.0 - 0.0 /100 WBCs Final    RBC 08/20/2024  4.94  4.00 - 5.20 x10*6/uL Final    Hemoglobin 08/20/2024 13.8  12.0 - 16.0 g/dL Final    Hematocrit 08/20/2024 41.9  36.0 - 46.0 % Final    MCV 08/20/2024 85  80 - 100 fL Final    MCH 08/20/2024 27.9  26.0 - 34.0 pg Final    MCHC 08/20/2024 32.9  32.0 - 36.0 g/dL Final    RDW 08/20/2024 12.2  11.5 - 14.5 % Final    Platelets 08/20/2024 258  150 - 450 x10*3/uL Final    Neutrophils % 08/20/2024 68.9  40.0 - 80.0 % Final    Immature Granulocytes %, Automated 08/20/2024 0.3  0.0 - 0.9 % Final    Immature Granulocyte Count (IG) includes promyelocytes, myelocytes and metamyelocytes but does not include bands. Percent differential counts (%) should be interpreted in the context of the absolute cell counts (cells/UL).    Lymphocytes % 08/20/2024 21.5  13.0 - 44.0 % Final    Monocytes % 08/20/2024 8.1  2.0 - 10.0 % Final    Eosinophils % 08/20/2024 0.7  0.0 - 6.0 % Final    Basophils % 08/20/2024 0.5  0.0 - 2.0 % Final    Neutrophils Absolute 08/20/2024 4.10  1.20 - 7.70 x10*3/uL Final    Percent differential counts (%) should be interpreted in the context of the absolute cell counts (cells/uL).    Immature Granulocytes Absolute, Au* 08/20/2024 0.02  0.00 - 0.70 x10*3/uL Final    Lymphocytes Absolute 08/20/2024 1.28  1.20 - 4.80 x10*3/uL Final    Monocytes Absolute 08/20/2024 0.48  0.10 - 1.00 x10*3/uL Final    Eosinophils Absolute 08/20/2024 0.04  0.00 - 0.70 x10*3/uL Final    Basophils Absolute 08/20/2024 0.03  0.00 - 0.10 x10*3/uL Final   Lab on 05/17/2024   Component Date Value Ref Range Status    WBC 05/17/2024 6.3  4.4 - 11.3 x10*3/uL Final    nRBC 05/17/2024 0.0  0.0 - 0.0 /100 WBCs Final    RBC 05/17/2024 4.88  4.00 - 5.20 x10*6/uL Final    Hemoglobin 05/17/2024 13.8  12.0 - 16.0 g/dL Final    Hematocrit 05/17/2024 42.4  36.0 - 46.0 % Final    MCV 05/17/2024 87  80 - 100 fL Final    MCH 05/17/2024 28.3  26.0 - 34.0 pg Final    MCHC 05/17/2024 32.5  32.0 - 36.0 g/dL Final    RDW 05/17/2024 12.4  11.5 - 14.5 % Final     Platelets 05/17/2024 276  150 - 450 x10*3/uL Final    Neutrophils % 05/17/2024 65.9  40.0 - 80.0 % Final    Immature Granulocytes %, Automated 05/17/2024 0.2  0.0 - 0.9 % Final    Immature Granulocyte Count (IG) includes promyelocytes, myelocytes and metamyelocytes but does not include bands. Percent differential counts (%) should be interpreted in the context of the absolute cell counts (cells/UL).    Lymphocytes % 05/17/2024 27.3  13.0 - 44.0 % Final    Monocytes % 05/17/2024 5.1  2.0 - 10.0 % Final    Eosinophils % 05/17/2024 1.0  0.0 - 6.0 % Final    Basophils % 05/17/2024 0.5  0.0 - 2.0 % Final    Neutrophils Absolute 05/17/2024 4.15  1.20 - 7.70 x10*3/uL Final    Percent differential counts (%) should be interpreted in the context of the absolute cell counts (cells/uL).    Immature Granulocytes Absolute, Au* 05/17/2024 0.01  0.00 - 0.70 x10*3/uL Final    Lymphocytes Absolute 05/17/2024 1.72  1.20 - 4.80 x10*3/uL Final    Monocytes Absolute 05/17/2024 0.32  0.10 - 1.00 x10*3/uL Final    Eosinophils Absolute 05/17/2024 0.06  0.00 - 0.70 x10*3/uL Final    Basophils Absolute 05/17/2024 0.03  0.00 - 0.10 x10*3/uL Final    Glucose 05/17/2024 80  74 - 99 mg/dL Final    Sodium 05/17/2024 139  136 - 145 mmol/L Final    Potassium 05/17/2024 4.3  3.5 - 5.3 mmol/L Final    Chloride 05/17/2024 106  98 - 107 mmol/L Final    Bicarbonate 05/17/2024 23  21 - 32 mmol/L Final    Anion Gap 05/17/2024 14  10 - 20 mmol/L Final    Urea Nitrogen 05/17/2024 12  6 - 23 mg/dL Final    Creatinine 05/17/2024 0.74  0.50 - 1.05 mg/dL Final    eGFR 05/17/2024 >90  >60 mL/min/1.73m*2 Final    Calculations of estimated GFR are performed using the 2021 CKD-EPI Study Refit equation without the race variable for the IDMS-Traceable creatinine methods.  https://jasn.asnjournals.org/content/early/2021/09/22/ASN.6877467600    Calcium 05/17/2024 9.1  8.6 - 10.6 mg/dL Final    Albumin 05/17/2024 4.3  3.4 - 5.0 g/dL Final    Alkaline Phosphatase  05/17/2024 45  33 - 110 U/L Final    Total Protein 05/17/2024 7.0  6.4 - 8.2 g/dL Final    AST 05/17/2024 19  9 - 39 U/L Final    Bilirubin, Total 05/17/2024 0.8  0.0 - 1.2 mg/dL Final    ALT 05/17/2024 21  7 - 45 U/L Final    Patients treated with Sulfasalazine may generate falsely decreased results for ALT.    Cholesterol 05/17/2024 183  0 - 199 mg/dL Final          Age      Desirable   Borderline High   High     0-19 Y     0 - 169       170 - 199     >/= 200    20-24 Y     0 - 189       190 - 224     >/= 225         >24 Y     0 - 199       200 - 239     >/= 240   **All ranges are based on fasting samples. Specific   therapeutic targets will vary based on patient-specific   cardiac risk.    Pediatric guidelines reference:Pediatrics 2011, 128(S5).Adult guidelines reference: NCEP ATPIII Guidelines,ZENAIDA 2001, 258:2486-97    Venipuncture immediately after or during the administration of Metamizole may lead to falsely low results. Testing should be performed immediately prior to Metamizole dosing.    HDL-Cholesterol 05/17/2024 70.3  mg/dL Final      Age       Very Low   Low     Normal    High    0-19 Y    < 35      < 40     40-45     ----  20-24 Y    ----     < 40      >45      ----        >24 Y      ----     < 40     40-60      >60      Cholesterol/HDL Ratio 05/17/2024 2.6   Final      Ref Values  Desirable  < 3.4  High Risk  > 5.0    LDL Calculated 05/17/2024 94  <=99 mg/dL Final                                Near   Borderline      AGE      Desirable  Optimal    High     High     Very High     0-19 Y     0 - 109     ---    110-129   >/= 130     ----    20-24 Y     0 - 119     ---    120-159   >/= 160     ----      >24 Y     0 -  99   100-129  130-159   160-189     >/=190      VLDL 05/17/2024 18  0 - 40 mg/dL Final    Triglycerides 05/17/2024 92  0 - 149 mg/dL Final       Age         Desirable   Borderline High   High     Very High   0 D-90 D    19 - 174         ----         ----        ----  91 D- 9 Y     0 -  74         75 -  99     >/= 100      ----    10-19 Y     0 -  89        90 - 129     >/= 130      ----    20-24 Y     0 - 114       115 - 149     >/= 150      ----         >24 Y     0 - 149       150 - 199    200- 499    >/= 500    Venipuncture immediately after or during the administration of Metamizole may lead to falsely low results. Testing should be performed immediately prior to Metamizole dosing.    Non HDL Cholesterol 05/17/2024 113  0 - 149 mg/dL Final          Age       Desirable   Borderline High   High     Very High     0-19 Y     0 - 119       120 - 144     >/= 145    >/= 160    20-24 Y     0 - 149       150 - 189     >/= 190      ----         >24 Y    30 mg/dL above LDL Cholesterol goal      Thyroid Stimulating Hormone 05/17/2024 1.33  0.44 - 3.98 mIU/L Final    Vitamin D, 25-Hydroxy, Total 05/17/2024 31  30 - 100 ng/mL Final    Hemoglobin A1C 05/17/2024 4.6  see below % Final    Estimated Average Glucose 05/17/2024 85  Not Established mg/dL Final   Office Visit on 12/02/2023   Component Date Value Ref Range Status    Preg Test, Ur 12/02/2023 Negative  Negative Final     Current Outpatient Medications on File Prior to Visit   Medication Sig Dispense Refill    albuterol 90 mcg/actuation inhaler Inhale 2 puffs every 4 hours if needed for shortness of breath.      atomoxetine (Strattera) 40 mg capsule Take 1 capsule (40 mg) by mouth 2 times a day. 180 capsule 1    cholecalciferol (Vitamin D-3) 125 MCG (5000 UT) capsule Take 1 capsule (125 mcg) by mouth once daily.      drospirenone-ethinyl estradioL (Edilia, Gianvi) 3-0.02 mg tablet Take 1 tablet by mouth once daily.      fluticasone (Flonase) 50 mcg/actuation nasal spray Administer 2 sprays into affected nostril(s) once daily.      fluticasone propion-salmeteroL (Advair Diskus) 100-50 mcg/dose diskus inhaler Inhale 1 puff every 12 hours.      levothyroxine (Synthroid, Levoxyl) 25 mcg tablet 1 tablet per day except Saturday and Sunday then pt takes 2 tablets 102  tablet 0    predniSONE (Deltasone) 5 mg tablet Take 10 tablets (50 mg) by mouth once daily for 1 day, THEN 9 tablets (45 mg) once daily for 1 day, THEN 8 tablets (40 mg) once daily for 1 day, THEN 7 tablets (35 mg) once daily for 1 day, THEN 6 tablets (30 mg) once daily for 1 day, THEN 5 tablets (25 mg) once daily for 1 day, THEN 4 tablets (20 mg) once daily for 1 day, THEN 3 tablets (15 mg) once daily for 1 day, THEN 2 tablets (10 mg) once daily for 1 day, THEN 1 tablet (5 mg) once daily for 1 day. 55 tablet 0     No current facility-administered medications on file prior to visit.     No images are attached to the encounter.          Assessment/Plan   Diagnoses and all orders for this visit:  Acute bronchitis, unspecified organism  -     XR chest 2 views; Future  -     azithromycin (Zithromax) 500 mg tablet; Take 1 tablet (500 mg) by mouth once daily for 5 days.  -     benzonatate (Tessalon) 100 mg capsule; Take 1 capsule (100 mg) by mouth 3 times a day as needed for cough. Do not crush or chew.    Patient to start on antibiotics  Patient to have CXR if worse  Patient to call if questions or concerns

## 2025-01-16 ENCOUNTER — TELEPHONE (OUTPATIENT)
Dept: PRIMARY CARE | Facility: CLINIC | Age: 35
End: 2025-01-16
Payer: COMMERCIAL

## 2025-01-16 DIAGNOSIS — E03.9 ACQUIRED HYPOTHYROIDISM: ICD-10-CM

## 2025-01-16 RX ORDER — LEVOTHYROXINE SODIUM 25 UG/1
TABLET ORAL
Qty: 102 TABLET | Refills: 0 | Status: SHIPPED | OUTPATIENT
Start: 2025-01-16

## 2025-01-16 NOTE — TELEPHONE ENCOUNTER
Fax request from [facility] for    levothyroxine (Synthroid, Levoxyl) 25 mcg tablet   1 tablet per day except Saturday and Sunday then pt takes 2 tablets    Quantity: 102 tablet     [facility] - [place] [id number] [facility]  [address] [facility] [address]  Phone: [phone]  Fax: [phone]

## 2025-02-26 DIAGNOSIS — F90.9 ATTENTION DEFICIT HYPERACTIVITY DISORDER (ADHD), UNSPECIFIED ADHD TYPE: ICD-10-CM

## 2025-02-26 RX ORDER — ATOMOXETINE 40 MG/1
40 CAPSULE ORAL 2 TIMES DAILY
Qty: 180 CAPSULE | Refills: 1 | Status: SHIPPED | OUTPATIENT
Start: 2025-02-26 | End: 2025-08-25

## 2025-04-17 ASSESSMENT — PROMIS GLOBAL HEALTH SCALE
CARRYOUT_SOCIAL_ACTIVITIES: VERY GOOD
RATE_SOCIAL_SATISFACTION: VERY GOOD
RATE_QUALITY_OF_LIFE: VERY GOOD
EMOTIONAL_PROBLEMS: RARELY
CARRYOUT_PHYSICAL_ACTIVITIES: COMPLETELY
RATE_AVERAGE_FATIGUE: MODERATE
RATE_AVERAGE_PAIN: 0
RATE_PHYSICAL_HEALTH: VERY GOOD
RATE_GENERAL_HEALTH: VERY GOOD
RATE_MENTAL_HEALTH: VERY GOOD

## 2025-04-21 PROBLEM — J45.909 ASTHMATIC BRONCHITIS (HHS-HCC): Status: RESOLVED | Noted: 2025-04-21 | Resolved: 2025-04-21

## 2025-04-21 PROBLEM — J30.2 SEASONAL ALLERGIES: Status: ACTIVE | Noted: 2025-04-21

## 2025-04-21 PROBLEM — F41.9 ANXIETY: Status: RESOLVED | Noted: 2023-01-30 | Resolved: 2025-04-21

## 2025-04-21 PROBLEM — J30.9 ALLERGIC RHINITIS: Status: ACTIVE | Noted: 2023-10-06

## 2025-04-21 ASSESSMENT — ENCOUNTER SYMPTOMS
APPETITE CHANGE: 0
CHEST TIGHTNESS: 0
CHOKING: 0
DIZZINESS: 0
PALPITATIONS: 0
BACK PAIN: 0
COUGH: 0
HEADACHES: 0
ACTIVITY CHANGE: 0
LIGHT-HEADEDNESS: 0
FEVER: 0
FATIGUE: 0
ARTHRALGIAS: 0
FACIAL ASYMMETRY: 0
COLOR CHANGE: 0

## 2025-04-21 NOTE — PROGRESS NOTES
"Subjective   Patient ID: Mara Herbert is a 35 y.o. female who presents for Annual Exam.    HPI   Patient presents for physical exam. Patient has been diagnosed with hypothyroidism and vitamin d deficiency.     Fam Hx  Mom () living,  Dad () living,  PGM colon polyps not cancer     Exercise walks, yoga, runs  ETOH denies, once a month  Caffeine 16 oz coffee/day  Tobacco denies     OB/GYN Dr. Talbert     Mammogram @ 41  DEXA @ 66  EGD, Colonoscopy 2017 Dr. Sewell not due until 45-50      Past Med Hx  Hypothyroidism  Vitamin D deficiency  Eosinophilic esophagitis (resolved)  severe asthma (resolved)     Past Surg Hx  T & A  TMJ surgery  2      Patient denies other complaints.     Review of Systems   Constitutional:  Negative for activity change, appetite change, fatigue and fever.   HENT:  Negative for congestion.    Respiratory:  Negative for cough, choking and chest tightness.    Cardiovascular:  Negative for chest pain, palpitations and leg swelling.   Musculoskeletal:  Negative for arthralgias, back pain and gait problem.   Skin:  Negative for color change and pallor.   Neurological:  Negative for dizziness, facial asymmetry, light-headedness and headaches.       Objective   /70 (BP Location: Left arm, Patient Position: Sitting)   Pulse 88   Ht 1.549 m (5' 1\")   Wt 62.5 kg (137 lb 12.8 oz)   BMI 26.04 kg/m²   BSA Body surface area is 1.64 meters squared.      Physical Exam  Constitutional:       General: She is not in acute distress.     Appearance: Normal appearance. She is not toxic-appearing.   HENT:      Head: Normocephalic.      Right Ear: Tympanic membrane, ear canal and external ear normal.      Left Ear: Tympanic membrane, ear canal and external ear normal.   Eyes:      Conjunctiva/sclera: Conjunctivae normal.      Pupils: Pupils are equal, round, and reactive to light.   Cardiovascular:      Rate and Rhythm: Normal rate and regular rhythm.      Pulses: Normal pulses.      Heart sounds: " Normal heart sounds.   Pulmonary:      Effort: No respiratory distress.      Breath sounds: No wheezing, rhonchi or rales.   Abdominal:      General: Bowel sounds are normal. There is no distension.      Palpations: Abdomen is soft.      Tenderness: There is no abdominal tenderness.   Musculoskeletal:         General: No swelling or tenderness.   Skin:     Findings: No lesion or rash.   Neurological:      General: No focal deficit present.      Mental Status: She is alert and oriented to person, place, and time. Mental status is at baseline.      Gait: Gait normal.   Psychiatric:         Mood and Affect: Mood normal.         Behavior: Behavior normal.         Thought Content: Thought content normal.         Judgment: Judgment normal.       Lab on 08/20/2024   Component Date Value Ref Range Status    WBC 08/20/2024 6.0  4.4 - 11.3 x10*3/uL Final    nRBC 08/20/2024 0.0  0.0 - 0.0 /100 WBCs Final    RBC 08/20/2024 4.94  4.00 - 5.20 x10*6/uL Final    Hemoglobin 08/20/2024 13.8  12.0 - 16.0 g/dL Final    Hematocrit 08/20/2024 41.9  36.0 - 46.0 % Final    MCV 08/20/2024 85  80 - 100 fL Final    MCH 08/20/2024 27.9  26.0 - 34.0 pg Final    MCHC 08/20/2024 32.9  32.0 - 36.0 g/dL Final    RDW 08/20/2024 12.2  11.5 - 14.5 % Final    Platelets 08/20/2024 258  150 - 450 x10*3/uL Final    Neutrophils % 08/20/2024 68.9  40.0 - 80.0 % Final    Immature Granulocytes %, Automated 08/20/2024 0.3  0.0 - 0.9 % Final    Immature Granulocyte Count (IG) includes promyelocytes, myelocytes and metamyelocytes but does not include bands. Percent differential counts (%) should be interpreted in the context of the absolute cell counts (cells/UL).    Lymphocytes % 08/20/2024 21.5  13.0 - 44.0 % Final    Monocytes % 08/20/2024 8.1  2.0 - 10.0 % Final    Eosinophils % 08/20/2024 0.7  0.0 - 6.0 % Final    Basophils % 08/20/2024 0.5  0.0 - 2.0 % Final    Neutrophils Absolute 08/20/2024 4.10  1.20 - 7.70 x10*3/uL Final    Percent differential counts  (%) should be interpreted in the context of the absolute cell counts (cells/uL).    Immature Granulocytes Absolute, Au* 08/20/2024 0.02  0.00 - 0.70 x10*3/uL Final    Lymphocytes Absolute 08/20/2024 1.28  1.20 - 4.80 x10*3/uL Final    Monocytes Absolute 08/20/2024 0.48  0.10 - 1.00 x10*3/uL Final    Eosinophils Absolute 08/20/2024 0.04  0.00 - 0.70 x10*3/uL Final    Basophils Absolute 08/20/2024 0.03  0.00 - 0.10 x10*3/uL Final   Lab on 05/17/2024   Component Date Value Ref Range Status    WBC 05/17/2024 6.3  4.4 - 11.3 x10*3/uL Final    nRBC 05/17/2024 0.0  0.0 - 0.0 /100 WBCs Final    RBC 05/17/2024 4.88  4.00 - 5.20 x10*6/uL Final    Hemoglobin 05/17/2024 13.8  12.0 - 16.0 g/dL Final    Hematocrit 05/17/2024 42.4  36.0 - 46.0 % Final    MCV 05/17/2024 87  80 - 100 fL Final    MCH 05/17/2024 28.3  26.0 - 34.0 pg Final    MCHC 05/17/2024 32.5  32.0 - 36.0 g/dL Final    RDW 05/17/2024 12.4  11.5 - 14.5 % Final    Platelets 05/17/2024 276  150 - 450 x10*3/uL Final    Neutrophils % 05/17/2024 65.9  40.0 - 80.0 % Final    Immature Granulocytes %, Automated 05/17/2024 0.2  0.0 - 0.9 % Final    Immature Granulocyte Count (IG) includes promyelocytes, myelocytes and metamyelocytes but does not include bands. Percent differential counts (%) should be interpreted in the context of the absolute cell counts (cells/UL).    Lymphocytes % 05/17/2024 27.3  13.0 - 44.0 % Final    Monocytes % 05/17/2024 5.1  2.0 - 10.0 % Final    Eosinophils % 05/17/2024 1.0  0.0 - 6.0 % Final    Basophils % 05/17/2024 0.5  0.0 - 2.0 % Final    Neutrophils Absolute 05/17/2024 4.15  1.20 - 7.70 x10*3/uL Final    Percent differential counts (%) should be interpreted in the context of the absolute cell counts (cells/uL).    Immature Granulocytes Absolute, Au* 05/17/2024 0.01  0.00 - 0.70 x10*3/uL Final    Lymphocytes Absolute 05/17/2024 1.72  1.20 - 4.80 x10*3/uL Final    Monocytes Absolute 05/17/2024 0.32  0.10 - 1.00 x10*3/uL Final    Eosinophils  Absolute 05/17/2024 0.06  0.00 - 0.70 x10*3/uL Final    Basophils Absolute 05/17/2024 0.03  0.00 - 0.10 x10*3/uL Final    Glucose 05/17/2024 80  74 - 99 mg/dL Final    Sodium 05/17/2024 139  136 - 145 mmol/L Final    Potassium 05/17/2024 4.3  3.5 - 5.3 mmol/L Final    Chloride 05/17/2024 106  98 - 107 mmol/L Final    Bicarbonate 05/17/2024 23  21 - 32 mmol/L Final    Anion Gap 05/17/2024 14  10 - 20 mmol/L Final    Urea Nitrogen 05/17/2024 12  6 - 23 mg/dL Final    Creatinine 05/17/2024 0.74  0.50 - 1.05 mg/dL Final    eGFR 05/17/2024 >90  >60 mL/min/1.73m*2 Final    Calculations of estimated GFR are performed using the 2021 CKD-EPI Study Refit equation without the race variable for the IDMS-Traceable creatinine methods.  https://jasn.asnjournals.org/content/early/2021/09/22/ASN.1259653689    Calcium 05/17/2024 9.1  8.6 - 10.6 mg/dL Final    Albumin 05/17/2024 4.3  3.4 - 5.0 g/dL Final    Alkaline Phosphatase 05/17/2024 45  33 - 110 U/L Final    Total Protein 05/17/2024 7.0  6.4 - 8.2 g/dL Final    AST 05/17/2024 19  9 - 39 U/L Final    Bilirubin, Total 05/17/2024 0.8  0.0 - 1.2 mg/dL Final    ALT 05/17/2024 21  7 - 45 U/L Final    Patients treated with Sulfasalazine may generate falsely decreased results for ALT.    Cholesterol 05/17/2024 183  0 - 199 mg/dL Final          Age      Desirable   Borderline High   High     0-19 Y     0 - 169       170 - 199     >/= 200    20-24 Y     0 - 189       190 - 224     >/= 225         >24 Y     0 - 199       200 - 239     >/= 240   **All ranges are based on fasting samples. Specific   therapeutic targets will vary based on patient-specific   cardiac risk.    Pediatric guidelines reference:Pediatrics 2011, 128(S5).Adult guidelines reference: NCEP ATPIII Guidelines,ZENAIDA 2001, 258:2486-97    Venipuncture immediately after or during the administration of Metamizole may lead to falsely low results. Testing should be performed immediately prior to Metamizole dosing.     HDL-Cholesterol 05/17/2024 70.3  mg/dL Final      Age       Very Low   Low     Normal    High    0-19 Y    < 35      < 40     40-45     ----  20-24 Y    ----     < 40      >45      ----        >24 Y      ----     < 40     40-60      >60      Cholesterol/HDL Ratio 05/17/2024 2.6   Final      Ref Values  Desirable  < 3.4  High Risk  > 5.0    LDL Calculated 05/17/2024 94  <=99 mg/dL Final                                Near   Borderline      AGE      Desirable  Optimal    High     High     Very High     0-19 Y     0 - 109     ---    110-129   >/= 130     ----    20-24 Y     0 - 119     ---    120-159   >/= 160     ----      >24 Y     0 -  99   100-129  130-159   160-189     >/=190      VLDL 05/17/2024 18  0 - 40 mg/dL Final    Triglycerides 05/17/2024 92  0 - 149 mg/dL Final       Age         Desirable   Borderline High   High     Very High   0 D-90 D    19 - 174         ----         ----        ----  91 D- 9 Y     0 -  74        75 -  99     >/= 100      ----    10-19 Y     0 -  89        90 - 129     >/= 130      ----    20-24 Y     0 - 114       115 - 149     >/= 150      ----         >24 Y     0 - 149       150 - 199    200- 499    >/= 500    Venipuncture immediately after or during the administration of Metamizole may lead to falsely low results. Testing should be performed immediately prior to Metamizole dosing.    Non HDL Cholesterol 05/17/2024 113  0 - 149 mg/dL Final          Age       Desirable   Borderline High   High     Very High     0-19 Y     0 - 119       120 - 144     >/= 145    >/= 160    20-24 Y     0 - 149       150 - 189     >/= 190      ----         >24 Y    30 mg/dL above LDL Cholesterol goal      Thyroid Stimulating Hormone 05/17/2024 1.33  0.44 - 3.98 mIU/L Final    Vitamin D, 25-Hydroxy, Total 05/17/2024 31  30 - 100 ng/mL Final    Hemoglobin A1C 05/17/2024 4.6  see below % Final    Estimated Average Glucose 05/17/2024 85  Not Established mg/dL Final     Current Outpatient Medications on File  Prior to Visit   Medication Sig Dispense Refill    albuterol 90 mcg/actuation inhaler Inhale 2 puffs every 4 hours if needed for shortness of breath.      atomoxetine (Strattera) 40 mg capsule Take 1 capsule (40 mg) by mouth 2 times a day. 180 capsule 1    cholecalciferol (Vitamin D-3) 125 MCG (5000 UT) capsule Take 1 capsule (125 mcg) by mouth once daily.      fluticasone (Flonase) 50 mcg/actuation nasal spray Administer 2 sprays into affected nostril(s) once daily.      fluticasone propion-salmeteroL (Advair Diskus) 100-50 mcg/dose diskus inhaler Inhale 1 puff every 12 hours.      levothyroxine (Synthroid, Levoxyl) 25 mcg tablet 1 tablet per day except Saturday and Sunday then pt takes 2 tablets 102 tablet 0    [DISCONTINUED] drospirenone-ethinyl estradioL (Edilia, Gianvi) 3-0.02 mg tablet Take 1 tablet by mouth once daily.       No current facility-administered medications on file prior to visit.     No images are attached to the encounter.            Assessment/Plan   Diagnoses and all orders for this visit:  Healthcare maintenance  Raynaud's phenomenon without gangrene  -     Uric Acid; Future  -     SOCORRO with Reflex to MAIRA; Future  -     Citrulline Antibody, IgG; Future  -     C-Reactive Protein; Future  -     Rheumatoid Factor; Future  -     Sedimentation Rate; Future  Allergic rhinitis, unspecified seasonality, unspecified trigger  Abnormal glucose level  Vitamin D deficiency  Mild persistent asthma without complication ([facility])  Arthralgia, unspecified joint  -     Uric Acid; Future  -     SOCORRO with Reflex to MAIRA; Future  -     Citrulline Antibody, IgG; Future  -     C-Reactive Protein; Future  -     Rheumatoid Factor; Future  -     Sedimentation Rate; Future      1. Patient's blood work unavailable at this office visit  2. Patient's LDL goal less than 130 current LDL available  3. Patient's glucose is unavailable  4. Mammogram @ 41  5. DEXA @ 66  6. EGD, Colonoscopy 2017 Dr. Sewell not due until 45-50   7.  Patient to call if questions or concerns

## 2025-04-22 ENCOUNTER — APPOINTMENT (OUTPATIENT)
Dept: PRIMARY CARE | Facility: CLINIC | Age: 35
End: 2025-04-22
Payer: COMMERCIAL

## 2025-04-22 VITALS
HEIGHT: 61 IN | WEIGHT: 137.8 LBS | SYSTOLIC BLOOD PRESSURE: 112 MMHG | BODY MASS INDEX: 26.01 KG/M2 | DIASTOLIC BLOOD PRESSURE: 70 MMHG | HEART RATE: 88 BPM

## 2025-04-22 DIAGNOSIS — J30.9 ALLERGIC RHINITIS, UNSPECIFIED SEASONALITY, UNSPECIFIED TRIGGER: ICD-10-CM

## 2025-04-22 DIAGNOSIS — E55.9 VITAMIN D DEFICIENCY: ICD-10-CM

## 2025-04-22 DIAGNOSIS — R73.09 ABNORMAL GLUCOSE LEVEL: ICD-10-CM

## 2025-04-22 DIAGNOSIS — Z00.00 HEALTHCARE MAINTENANCE: Primary | ICD-10-CM

## 2025-04-22 DIAGNOSIS — M25.50 ARTHRALGIA, UNSPECIFIED JOINT: ICD-10-CM

## 2025-04-22 DIAGNOSIS — I73.00 RAYNAUD'S PHENOMENON WITHOUT GANGRENE: ICD-10-CM

## 2025-04-22 DIAGNOSIS — J45.30 MILD PERSISTENT ASTHMA WITHOUT COMPLICATION (HHS-HCC): ICD-10-CM

## 2025-04-22 PROCEDURE — 3008F BODY MASS INDEX DOCD: CPT | Performed by: FAMILY MEDICINE

## 2025-04-22 PROCEDURE — 99395 PREV VISIT EST AGE 18-39: CPT | Performed by: FAMILY MEDICINE

## 2025-04-22 PROCEDURE — 1036F TOBACCO NON-USER: CPT | Performed by: FAMILY MEDICINE

## 2025-04-22 ASSESSMENT — PATIENT HEALTH QUESTIONNAIRE - PHQ9
1. LITTLE INTEREST OR PLEASURE IN DOING THINGS: NOT AT ALL
10. IF YOU CHECKED OFF ANY PROBLEMS, HOW DIFFICULT HAVE THESE PROBLEMS MADE IT FOR YOU TO DO YOUR WORK, TAKE CARE OF THINGS AT HOME, OR GET ALONG WITH OTHER PEOPLE: NOT DIFFICULT AT ALL
2. FEELING DOWN, DEPRESSED OR HOPELESS: NOT AT ALL
SUM OF ALL RESPONSES TO PHQ9 QUESTIONS 1 AND 2: 0

## 2025-04-22 ASSESSMENT — ENCOUNTER SYMPTOMS
DEPRESSION: 0
LOSS OF SENSATION IN FEET: 0
OCCASIONAL FEELINGS OF UNSTEADINESS: 0

## 2025-04-25 LAB
25(OH)D3+25(OH)D2 SERPL-MCNC: 23 NG/ML (ref 30–100)
ALBUMIN SERPL-MCNC: 4.8 G/DL (ref 3.6–5.1)
ALP SERPL-CCNC: 53 U/L (ref 31–125)
ALT SERPL-CCNC: 15 U/L (ref 6–29)
ANA SER QL IF: NORMAL
ANION GAP SERPL CALCULATED.4IONS-SCNC: 8 MMOL/L (CALC) (ref 7–17)
AST SERPL-CCNC: 15 U/L (ref 10–30)
BASOPHILS # BLD AUTO: 29 CELLS/UL (ref 0–200)
BASOPHILS NFR BLD AUTO: 0.6 %
BILIRUB SERPL-MCNC: 1.1 MG/DL (ref 0.2–1.2)
BUN SERPL-MCNC: 11 MG/DL (ref 7–25)
CALCIUM SERPL-MCNC: 9.3 MG/DL (ref 8.6–10.2)
CCP IGG SERPL-ACNC: <16 UNITS
CHLORIDE SERPL-SCNC: 108 MMOL/L (ref 98–110)
CHOLEST SERPL-MCNC: 185 MG/DL
CHOLEST/HDLC SERPL: 2.4 (CALC)
CO2 SERPL-SCNC: 25 MMOL/L (ref 20–32)
CREAT SERPL-MCNC: 0.74 MG/DL (ref 0.5–0.97)
CRP SERPL-MCNC: <3 MG/L
EGFRCR SERPLBLD CKD-EPI 2021: 109 ML/MIN/1.73M2
EOSINOPHIL # BLD AUTO: 69 CELLS/UL (ref 15–500)
EOSINOPHIL NFR BLD AUTO: 1.4 %
ERYTHROCYTE [DISTWIDTH] IN BLOOD BY AUTOMATED COUNT: 12 % (ref 11–15)
ERYTHROCYTE [SEDIMENTATION RATE] IN BLOOD BY WESTERGREN METHOD: 2 MM/H
GLUCOSE SERPL-MCNC: 84 MG/DL (ref 65–99)
HCT VFR BLD AUTO: 44.1 % (ref 35–45)
HDLC SERPL-MCNC: 78 MG/DL
HGB BLD-MCNC: 14.4 G/DL (ref 11.7–15.5)
LDLC SERPL CALC-MCNC: 96 MG/DL (CALC)
LYMPHOCYTES # BLD AUTO: 1661 CELLS/UL (ref 850–3900)
LYMPHOCYTES NFR BLD AUTO: 33.9 %
MCH RBC QN AUTO: 28.2 PG (ref 27–33)
MCHC RBC AUTO-ENTMCNC: 32.7 G/DL (ref 32–36)
MCV RBC AUTO: 86.5 FL (ref 80–100)
MONOCYTES # BLD AUTO: 319 CELLS/UL (ref 200–950)
MONOCYTES NFR BLD AUTO: 6.5 %
NEUTROPHILS # BLD AUTO: 2822 CELLS/UL (ref 1500–7800)
NEUTROPHILS NFR BLD AUTO: 57.6 %
NONHDLC SERPL-MCNC: 107 MG/DL (CALC)
PLATELET # BLD AUTO: 273 THOUSAND/UL (ref 140–400)
PMV BLD REES-ECKER: 10.7 FL (ref 7.5–12.5)
POTASSIUM SERPL-SCNC: 4.7 MMOL/L (ref 3.5–5.3)
PROT SERPL-MCNC: 7 G/DL (ref 6.1–8.1)
RBC # BLD AUTO: 5.1 MILLION/UL (ref 3.8–5.1)
RHEUMATOID FACT SERPL-ACNC: 13 IU/ML
SODIUM SERPL-SCNC: 141 MMOL/L (ref 135–146)
TRIGL SERPL-MCNC: 38 MG/DL
TSH SERPL-ACNC: 1.77 MIU/L
URATE SERPL-MCNC: 3.7 MG/DL (ref 2.5–7)
WBC # BLD AUTO: 4.9 THOUSAND/UL (ref 3.8–10.8)

## 2025-04-29 LAB
25(OH)D3+25(OH)D2 SERPL-MCNC: 23 NG/ML (ref 30–100)
ALBUMIN SERPL-MCNC: 4.8 G/DL (ref 3.6–5.1)
ALP SERPL-CCNC: 53 U/L (ref 31–125)
ALT SERPL-CCNC: 15 U/L (ref 6–29)
ANA PAT SER IF-IMP: ABNORMAL
ANA SER QL IF: POSITIVE
ANA TITR SER IF: ABNORMAL TITER
ANION GAP SERPL CALCULATED.4IONS-SCNC: 8 MMOL/L (CALC) (ref 7–17)
AST SERPL-CCNC: 15 U/L (ref 10–30)
BASOPHILS # BLD AUTO: 29 CELLS/UL (ref 0–200)
BASOPHILS NFR BLD AUTO: 0.6 %
BILIRUB SERPL-MCNC: 1.1 MG/DL (ref 0.2–1.2)
BUN SERPL-MCNC: 11 MG/DL (ref 7–25)
CALCIUM SERPL-MCNC: 9.3 MG/DL (ref 8.6–10.2)
CCP IGG SERPL-ACNC: <16 UNITS
CENTROMERE B AB SER-ACNC: ABNORMAL AI
CHLORIDE SERPL-SCNC: 108 MMOL/L (ref 98–110)
CHOLEST SERPL-MCNC: 185 MG/DL
CHOLEST/HDLC SERPL: 2.4 (CALC)
CO2 SERPL-SCNC: 25 MMOL/L (ref 20–32)
CREAT SERPL-MCNC: 0.74 MG/DL (ref 0.5–0.97)
CRP SERPL-MCNC: <3 MG/L
DSDNA AB SER-ACNC: 1 IU/ML
EGFRCR SERPLBLD CKD-EPI 2021: 109 ML/MIN/1.73M2
ENA JO1 AB SER IA-ACNC: ABNORMAL AI
ENA RNP AB SER-ACNC: ABNORMAL AI
ENA SCL70 AB SER IA-ACNC: ABNORMAL AI
ENA SM AB SER IA-ACNC: ABNORMAL AI
ENA SM+RNP AB SER IA-ACNC: ABNORMAL AI
ENA SS-A AB SER IA-ACNC: ABNORMAL AI
ENA SS-B AB SER IA-ACNC: ABNORMAL AI
EOSINOPHIL # BLD AUTO: 69 CELLS/UL (ref 15–500)
EOSINOPHIL NFR BLD AUTO: 1.4 %
ERYTHROCYTE [DISTWIDTH] IN BLOOD BY AUTOMATED COUNT: 12 % (ref 11–15)
ERYTHROCYTE [SEDIMENTATION RATE] IN BLOOD BY WESTERGREN METHOD: 2 MM/H
GLUCOSE SERPL-MCNC: 84 MG/DL (ref 65–99)
HCT VFR BLD AUTO: 44.1 % (ref 35–45)
HDLC SERPL-MCNC: 78 MG/DL
HGB BLD-MCNC: 14.4 G/DL (ref 11.7–15.5)
LABORATORY COMMENT REPORT: ABNORMAL
LDLC SERPL CALC-MCNC: 96 MG/DL (CALC)
LYMPHOCYTES # BLD AUTO: 1661 CELLS/UL (ref 850–3900)
LYMPHOCYTES NFR BLD AUTO: 33.9 %
MCH RBC QN AUTO: 28.2 PG (ref 27–33)
MCHC RBC AUTO-ENTMCNC: 32.7 G/DL (ref 32–36)
MCV RBC AUTO: 86.5 FL (ref 80–100)
MONOCYTES # BLD AUTO: 319 CELLS/UL (ref 200–950)
MONOCYTES NFR BLD AUTO: 6.5 %
NEUTROPHILS # BLD AUTO: 2822 CELLS/UL (ref 1500–7800)
NEUTROPHILS NFR BLD AUTO: 57.6 %
NONHDLC SERPL-MCNC: 107 MG/DL (CALC)
NUCLEOSOME AB SER IA-ACNC: ABNORMAL AI
PLATELET # BLD AUTO: 273 THOUSAND/UL (ref 140–400)
PMV BLD REES-ECKER: 10.7 FL (ref 7.5–12.5)
POTASSIUM SERPL-SCNC: 4.7 MMOL/L (ref 3.5–5.3)
PROT SERPL-MCNC: 7 G/DL (ref 6.1–8.1)
RBC # BLD AUTO: 5.1 MILLION/UL (ref 3.8–5.1)
RHEUMATOID FACT SERPL-ACNC: 13 IU/ML
RIBOSOMAL P AB SER-ACNC: ABNORMAL AI
SODIUM SERPL-SCNC: 141 MMOL/L (ref 135–146)
TRIGL SERPL-MCNC: 38 MG/DL
TSH SERPL-ACNC: 1.77 MIU/L
URATE SERPL-MCNC: 3.7 MG/DL (ref 2.5–7)
WBC # BLD AUTO: 4.9 THOUSAND/UL (ref 3.8–10.8)

## 2025-04-30 DIAGNOSIS — R76.8 POSITIVE ANA (ANTINUCLEAR ANTIBODY): ICD-10-CM

## 2025-05-12 ENCOUNTER — APPOINTMENT (OUTPATIENT)
Dept: PRIMARY CARE | Facility: CLINIC | Age: 35
End: 2025-05-12
Payer: COMMERCIAL

## 2025-08-09 DIAGNOSIS — E03.9 ACQUIRED HYPOTHYROIDISM: ICD-10-CM

## 2025-08-09 RX ORDER — LEVOTHYROXINE SODIUM 25 UG/1
TABLET ORAL
Qty: 102 TABLET | Refills: 0 | Status: SHIPPED | OUTPATIENT
Start: 2025-08-09